# Patient Record
Sex: MALE | Race: WHITE | NOT HISPANIC OR LATINO | Employment: OTHER | URBAN - METROPOLITAN AREA
[De-identification: names, ages, dates, MRNs, and addresses within clinical notes are randomized per-mention and may not be internally consistent; named-entity substitution may affect disease eponyms.]

---

## 2023-04-03 DIAGNOSIS — E11.9 TYPE 2 DIABETES MELLITUS WITHOUT COMPLICATION, WITH LONG-TERM CURRENT USE OF INSULIN (MULTI): Primary | ICD-10-CM

## 2023-04-03 DIAGNOSIS — Z79.4 TYPE 2 DIABETES MELLITUS WITHOUT COMPLICATION, WITH LONG-TERM CURRENT USE OF INSULIN (MULTI): Primary | ICD-10-CM

## 2023-04-03 RX ORDER — PIOGLITAZONEHYDROCHLORIDE 45 MG/1
45 TABLET ORAL DAILY
Qty: 90 TABLET | Refills: 0 | Status: SHIPPED | OUTPATIENT
Start: 2023-04-03 | End: 2023-05-18

## 2023-04-03 RX ORDER — PIOGLITAZONEHYDROCHLORIDE 45 MG/1
1 TABLET ORAL DAILY
COMMUNITY
End: 2023-04-03 | Stop reason: SDUPTHER

## 2023-04-19 DIAGNOSIS — C61 PROSTATE CANCER (MULTI): ICD-10-CM

## 2023-04-19 RX ORDER — TAMSULOSIN HYDROCHLORIDE 0.4 MG/1
0.4 CAPSULE ORAL 2 TIMES DAILY
COMMUNITY
Start: 2023-01-23 | End: 2023-04-19 | Stop reason: SDUPTHER

## 2023-04-19 RX ORDER — TAMSULOSIN HYDROCHLORIDE 0.4 MG/1
0.4 CAPSULE ORAL 2 TIMES DAILY
Qty: 60 CAPSULE | Refills: 3 | Status: SHIPPED | OUTPATIENT
Start: 2023-04-19 | End: 2023-11-01

## 2023-05-15 DIAGNOSIS — Z79.4 TYPE 2 DIABETES MELLITUS WITHOUT COMPLICATION, WITH LONG-TERM CURRENT USE OF INSULIN (MULTI): ICD-10-CM

## 2023-05-15 DIAGNOSIS — E11.9 TYPE 2 DIABETES MELLITUS WITHOUT COMPLICATION, WITH LONG-TERM CURRENT USE OF INSULIN (MULTI): ICD-10-CM

## 2023-05-18 RX ORDER — PIOGLITAZONEHYDROCHLORIDE 45 MG/1
TABLET ORAL
Qty: 90 TABLET | Refills: 0 | Status: SHIPPED | OUTPATIENT
Start: 2023-05-18 | End: 2023-09-14

## 2023-06-05 DIAGNOSIS — E11.69 TYPE 2 DIABETES MELLITUS WITH OTHER SPECIFIED COMPLICATION, UNSPECIFIED WHETHER LONG TERM INSULIN USE (MULTI): ICD-10-CM

## 2023-06-05 DIAGNOSIS — K21.9 GASTROESOPHAGEAL REFLUX DISEASE, UNSPECIFIED WHETHER ESOPHAGITIS PRESENT: ICD-10-CM

## 2023-06-05 DIAGNOSIS — R53.83 OTHER FATIGUE: ICD-10-CM

## 2023-06-05 DIAGNOSIS — E78.5 HYPERLIPIDEMIA, UNSPECIFIED HYPERLIPIDEMIA TYPE: ICD-10-CM

## 2023-06-05 DIAGNOSIS — I10 HYPERTENSION, UNSPECIFIED TYPE: ICD-10-CM

## 2023-06-05 DIAGNOSIS — Z00.00 ENCOUNTER FOR GENERAL HEALTH EXAMINATION: ICD-10-CM

## 2023-06-05 RX ORDER — FLUTICASONE PROPIONATE 50 MCG
2 SPRAY, SUSPENSION (ML) NASAL DAILY
COMMUNITY
Start: 2020-01-29

## 2023-06-05 RX ORDER — FERROUS SULFATE 325(65) MG
1 TABLET ORAL DAILY
COMMUNITY
Start: 2021-08-20

## 2023-06-05 RX ORDER — METOPROLOL SUCCINATE 25 MG/1
25 TABLET, EXTENDED RELEASE ORAL DAILY
COMMUNITY
End: 2023-06-05 | Stop reason: SDUPTHER

## 2023-06-05 RX ORDER — METOPROLOL SUCCINATE 25 MG/1
25 TABLET, EXTENDED RELEASE ORAL DAILY
Qty: 90 TABLET | Refills: 0 | Status: SHIPPED | OUTPATIENT
Start: 2023-06-05 | End: 2023-09-14

## 2023-06-05 RX ORDER — FOSINOPRIL SODIUM 20 MG/1
20 TABLET ORAL DAILY
COMMUNITY
Start: 2018-06-12 | End: 2023-06-05 | Stop reason: SDUPTHER

## 2023-06-05 RX ORDER — ESOMEPRAZOLE MAGNESIUM 40 MG/1
40 CAPSULE, DELAYED RELEASE ORAL
COMMUNITY
End: 2023-06-05 | Stop reason: SDUPTHER

## 2023-06-05 RX ORDER — ESOMEPRAZOLE MAGNESIUM 40 MG/1
40 CAPSULE, DELAYED RELEASE ORAL
Qty: 90 CAPSULE | Refills: 0 | Status: SHIPPED | OUTPATIENT
Start: 2023-06-05 | End: 2023-11-01

## 2023-06-05 RX ORDER — HYDROXYZINE HYDROCHLORIDE 25 MG/1
25 TABLET, FILM COATED ORAL 3 TIMES DAILY PRN
COMMUNITY
End: 2024-01-25 | Stop reason: SDUPTHER

## 2023-06-05 RX ORDER — ATORVASTATIN CALCIUM 40 MG/1
40 TABLET, FILM COATED ORAL DAILY
Qty: 90 TABLET | Refills: 0 | Status: SHIPPED | OUTPATIENT
Start: 2023-06-05 | End: 2023-11-01

## 2023-06-05 RX ORDER — FOSINOPRIL SODIUM 20 MG/1
20 TABLET ORAL DAILY
Qty: 90 TABLET | Refills: 0 | Status: SHIPPED | OUTPATIENT
Start: 2023-06-05 | End: 2023-11-01

## 2023-06-05 RX ORDER — DULAGLUTIDE 3 MG/.5ML
INJECTION, SOLUTION SUBCUTANEOUS
COMMUNITY
Start: 2023-05-05 | End: 2023-11-08 | Stop reason: ALTCHOICE

## 2023-06-05 RX ORDER — LANOLIN ALCOHOL/MO/W.PET/CERES
1 CREAM (GRAM) TOPICAL DAILY
COMMUNITY
Start: 2021-02-11 | End: 2023-06-05 | Stop reason: ALTCHOICE

## 2023-06-05 RX ORDER — BLOOD-GLUCOSE METER
EACH MISCELLANEOUS
COMMUNITY
Start: 2020-06-11 | End: 2023-11-13 | Stop reason: HOSPADM

## 2023-06-05 RX ORDER — METFORMIN HYDROCHLORIDE 500 MG/1
2 TABLET ORAL 2 TIMES DAILY
COMMUNITY
Start: 2022-09-08 | End: 2023-06-05 | Stop reason: SINTOL

## 2023-06-05 RX ORDER — ASPIRIN 81 MG/1
1 TABLET ORAL DAILY
COMMUNITY
Start: 2021-02-11

## 2023-06-05 RX ORDER — ATORVASTATIN CALCIUM 40 MG/1
40 TABLET, FILM COATED ORAL DAILY
COMMUNITY
End: 2023-06-05 | Stop reason: SDUPTHER

## 2023-06-16 ENCOUNTER — LAB (OUTPATIENT)
Dept: LAB | Facility: LAB | Age: 78
End: 2023-06-16
Payer: MEDICARE

## 2023-06-16 DIAGNOSIS — I10 HYPERTENSION, UNSPECIFIED TYPE: ICD-10-CM

## 2023-06-16 DIAGNOSIS — E11.69 TYPE 2 DIABETES MELLITUS WITH OTHER SPECIFIED COMPLICATION, UNSPECIFIED WHETHER LONG TERM INSULIN USE (MULTI): ICD-10-CM

## 2023-06-16 DIAGNOSIS — Z00.00 ENCOUNTER FOR GENERAL HEALTH EXAMINATION: ICD-10-CM

## 2023-06-16 DIAGNOSIS — E78.5 HYPERLIPIDEMIA, UNSPECIFIED HYPERLIPIDEMIA TYPE: ICD-10-CM

## 2023-06-16 DIAGNOSIS — R53.83 OTHER FATIGUE: ICD-10-CM

## 2023-06-16 LAB
ALANINE AMINOTRANSFERASE (SGPT) (U/L) IN SER/PLAS: 20 U/L (ref 10–52)
ALBUMIN (G/DL) IN SER/PLAS: 3.9 G/DL (ref 3.4–5)
ALKALINE PHOSPHATASE (U/L) IN SER/PLAS: 62 U/L (ref 33–136)
ANION GAP IN SER/PLAS: 15 MMOL/L (ref 10–20)
ASPARTATE AMINOTRANSFERASE (SGOT) (U/L) IN SER/PLAS: 15 U/L (ref 9–39)
BILIRUBIN TOTAL (MG/DL) IN SER/PLAS: 0.8 MG/DL (ref 0–1.2)
CALCIUM (MG/DL) IN SER/PLAS: 9.2 MG/DL (ref 8.6–10.3)
CARBON DIOXIDE, TOTAL (MMOL/L) IN SER/PLAS: 24 MMOL/L (ref 21–32)
CHLORIDE (MMOL/L) IN SER/PLAS: 96 MMOL/L (ref 98–107)
CHOLESTEROL (MG/DL) IN SER/PLAS: 155 MG/DL (ref 0–199)
CHOLESTEROL IN HDL (MG/DL) IN SER/PLAS: 49.1 MG/DL
CHOLESTEROL/HDL RATIO: 3.2
CREATININE (MG/DL) IN SER/PLAS: 1.21 MG/DL (ref 0.5–1.3)
ERYTHROCYTE DISTRIBUTION WIDTH (RATIO) BY AUTOMATED COUNT: 13.5 % (ref 11.5–14.5)
ERYTHROCYTE MEAN CORPUSCULAR HEMOGLOBIN CONCENTRATION (G/DL) BY AUTOMATED: 32.1 G/DL (ref 32–36)
ERYTHROCYTE MEAN CORPUSCULAR VOLUME (FL) BY AUTOMATED COUNT: 91 FL (ref 80–100)
ERYTHROCYTES (10*6/UL) IN BLOOD BY AUTOMATED COUNT: 5.06 X10E12/L (ref 4.5–5.9)
GFR MALE: 62 ML/MIN/1.73M2
GLUCOSE (MG/DL) IN SER/PLAS: 489 MG/DL (ref 74–99)
HEMATOCRIT (%) IN BLOOD BY AUTOMATED COUNT: 45.8 % (ref 41–52)
HEMOGLOBIN (G/DL) IN BLOOD: 14.7 G/DL (ref 13.5–17.5)
LDL: 62 MG/DL (ref 0–99)
LEUKOCYTES (10*3/UL) IN BLOOD BY AUTOMATED COUNT: 4.4 X10E9/L (ref 4.4–11.3)
NON HDL CHOLESTEROL: 106 MG/DL
PLATELETS (10*3/UL) IN BLOOD AUTOMATED COUNT: 258 X10E9/L (ref 150–450)
POTASSIUM (MMOL/L) IN SER/PLAS: 3.8 MMOL/L (ref 3.5–5.3)
PROTEIN TOTAL: 6.3 G/DL (ref 6.4–8.2)
SODIUM (MMOL/L) IN SER/PLAS: 131 MMOL/L (ref 136–145)
THYROTROPIN (MIU/L) IN SER/PLAS BY DETECTION LIMIT <= 0.05 MIU/L: 1.73 MIU/L (ref 0.44–3.98)
TRIGLYCERIDE (MG/DL) IN SER/PLAS: 221 MG/DL (ref 0–149)
UREA NITROGEN (MG/DL) IN SER/PLAS: 14 MG/DL (ref 6–23)
VLDL: 44 MG/DL (ref 0–40)

## 2023-06-16 PROCEDURE — 84443 ASSAY THYROID STIM HORMONE: CPT

## 2023-06-16 PROCEDURE — 80053 COMPREHEN METABOLIC PANEL: CPT

## 2023-06-16 PROCEDURE — 82306 VITAMIN D 25 HYDROXY: CPT

## 2023-06-16 PROCEDURE — 83036 HEMOGLOBIN GLYCOSYLATED A1C: CPT

## 2023-06-16 PROCEDURE — 80061 LIPID PANEL: CPT

## 2023-06-16 PROCEDURE — 85027 COMPLETE CBC AUTOMATED: CPT

## 2023-06-16 PROCEDURE — 36415 COLL VENOUS BLD VENIPUNCTURE: CPT

## 2023-06-16 PROCEDURE — 84153 ASSAY OF PSA TOTAL: CPT

## 2023-06-17 LAB
CALCIDIOL (25 OH VITAMIN D3) (NG/ML) IN SER/PLAS: 41 NG/ML
PROSTATE SPECIFIC ANTIGEN,SCREEN: <0.1 NG/ML (ref 0–4)

## 2023-06-19 ENCOUNTER — TELEPHONE (OUTPATIENT)
Dept: PRIMARY CARE | Facility: CLINIC | Age: 78
End: 2023-06-19
Payer: MEDICARE

## 2023-06-19 LAB
ESTIMATED AVERAGE GLUCOSE FOR HBA1C: 427 MG/DL
HEMOGLOBIN A1C/HEMOGLOBIN TOTAL IN BLOOD: 16.5 %

## 2023-06-20 LAB
PROSTATE SPECIFIC AG (NG/ML) IN SER/PLAS: <0.1 NG/ML (ref 0–4)
PROSTATE SPECIFIC AG FREE (NG/ML) IN SER/PLAS: <0.1 NG/ML
PROSTATE SPECIFIC AG FREE/PROSTATE SPECIFIC AG TOTAL IN SER/PLAS: NORMAL %

## 2023-06-28 ENCOUNTER — APPOINTMENT (OUTPATIENT)
Dept: PRIMARY CARE | Facility: CLINIC | Age: 78
End: 2023-06-28
Payer: MEDICARE

## 2023-07-05 ENCOUNTER — APPOINTMENT (OUTPATIENT)
Dept: PRIMARY CARE | Facility: CLINIC | Age: 78
End: 2023-07-05
Payer: MEDICARE

## 2023-09-12 DIAGNOSIS — E11.9 TYPE 2 DIABETES MELLITUS WITHOUT COMPLICATION, WITH LONG-TERM CURRENT USE OF INSULIN (MULTI): ICD-10-CM

## 2023-09-12 DIAGNOSIS — I10 HYPERTENSION, UNSPECIFIED TYPE: ICD-10-CM

## 2023-09-12 DIAGNOSIS — Z79.4 TYPE 2 DIABETES MELLITUS WITHOUT COMPLICATION, WITH LONG-TERM CURRENT USE OF INSULIN (MULTI): ICD-10-CM

## 2023-09-14 RX ORDER — METOPROLOL SUCCINATE 25 MG/1
25 TABLET, EXTENDED RELEASE ORAL DAILY
Qty: 30 TABLET | Refills: 0 | Status: SHIPPED | OUTPATIENT
Start: 2023-09-14 | End: 2024-04-09 | Stop reason: SDUPTHER

## 2023-09-14 RX ORDER — PIOGLITAZONEHYDROCHLORIDE 45 MG/1
TABLET ORAL
Qty: 30 TABLET | Refills: 0 | Status: SHIPPED | OUTPATIENT
Start: 2023-09-14 | End: 2023-11-01

## 2023-10-31 DIAGNOSIS — Z79.4 TYPE 2 DIABETES MELLITUS WITHOUT COMPLICATION, WITH LONG-TERM CURRENT USE OF INSULIN (MULTI): ICD-10-CM

## 2023-10-31 DIAGNOSIS — I10 HYPERTENSION, UNSPECIFIED TYPE: ICD-10-CM

## 2023-10-31 DIAGNOSIS — C61 PROSTATE CANCER (MULTI): ICD-10-CM

## 2023-10-31 DIAGNOSIS — E78.5 HYPERLIPIDEMIA, UNSPECIFIED HYPERLIPIDEMIA TYPE: ICD-10-CM

## 2023-10-31 DIAGNOSIS — K21.9 GASTROESOPHAGEAL REFLUX DISEASE, UNSPECIFIED WHETHER ESOPHAGITIS PRESENT: ICD-10-CM

## 2023-10-31 DIAGNOSIS — E11.9 TYPE 2 DIABETES MELLITUS WITHOUT COMPLICATION, WITH LONG-TERM CURRENT USE OF INSULIN (MULTI): ICD-10-CM

## 2023-11-01 RX ORDER — ATORVASTATIN CALCIUM 40 MG/1
40 TABLET, FILM COATED ORAL DAILY
Qty: 90 TABLET | Refills: 1 | Status: SHIPPED | OUTPATIENT
Start: 2023-11-01 | End: 2024-03-12

## 2023-11-01 RX ORDER — FOSINOPRIL SODIUM 20 MG/1
20 TABLET ORAL DAILY
Qty: 90 TABLET | Refills: 1 | Status: SHIPPED | OUTPATIENT
Start: 2023-11-01 | End: 2024-05-30 | Stop reason: SDUPTHER

## 2023-11-01 RX ORDER — ESOMEPRAZOLE MAGNESIUM 40 MG/1
40 CAPSULE, DELAYED RELEASE ORAL
Qty: 90 CAPSULE | Refills: 1 | Status: SHIPPED | OUTPATIENT
Start: 2023-11-01 | End: 2023-12-11

## 2023-11-01 RX ORDER — TAMSULOSIN HYDROCHLORIDE 0.4 MG/1
CAPSULE ORAL
Qty: 60 CAPSULE | Refills: 3 | Status: SHIPPED | OUTPATIENT
Start: 2023-11-01 | End: 2024-04-29 | Stop reason: SDUPTHER

## 2023-11-01 RX ORDER — PIOGLITAZONEHYDROCHLORIDE 45 MG/1
TABLET ORAL
Qty: 30 TABLET | Refills: 3 | Status: SHIPPED | OUTPATIENT
Start: 2023-11-01 | End: 2023-11-13 | Stop reason: HOSPADM

## 2023-11-06 RX ORDER — MIRABEGRON 50 MG/1
50 TABLET, FILM COATED, EXTENDED RELEASE ORAL DAILY
COMMUNITY
Start: 2023-10-27

## 2023-11-06 ASSESSMENT — ENCOUNTER SYMPTOMS
BLACKOUTS: 0
CONFUSION: 0
TREMORS: 1
SPEECH DIFFICULTY: 0
NERVOUS/ANXIOUS: 1
HUNGER: 0
HEADACHES: 1
WEAKNESS: 1
POLYPHAGIA: 0
POLYDIPSIA: 0
FATIGUE: 0
VISUAL CHANGE: 0
SWEATS: 0
DIZZINESS: 0
WEIGHT LOSS: 1
BLURRED VISION: 0
SEIZURES: 0

## 2023-11-08 ENCOUNTER — OFFICE VISIT (OUTPATIENT)
Dept: PRIMARY CARE | Facility: CLINIC | Age: 78
End: 2023-11-08
Payer: MEDICARE

## 2023-11-08 VITALS
OXYGEN SATURATION: 95 % | WEIGHT: 156 LBS | SYSTOLIC BLOOD PRESSURE: 155 MMHG | HEIGHT: 68 IN | HEART RATE: 97 BPM | DIASTOLIC BLOOD PRESSURE: 76 MMHG | BODY MASS INDEX: 23.64 KG/M2

## 2023-11-08 DIAGNOSIS — K22.70 BARRETT'S ESOPHAGUS WITHOUT DYSPLASIA: ICD-10-CM

## 2023-11-08 DIAGNOSIS — C61 MALIGNANT NEOPLASM OF PROSTATE (MULTI): ICD-10-CM

## 2023-11-08 DIAGNOSIS — E11.69 TYPE 2 DIABETES MELLITUS WITH OTHER SPECIFIED COMPLICATION, WITHOUT LONG-TERM CURRENT USE OF INSULIN (MULTI): ICD-10-CM

## 2023-11-08 DIAGNOSIS — F32.A DEPRESSIVE DISORDER: ICD-10-CM

## 2023-11-08 DIAGNOSIS — Z00.00 MEDICARE ANNUAL WELLNESS VISIT, SUBSEQUENT: Primary | ICD-10-CM

## 2023-11-08 DIAGNOSIS — G95.20 CERVICAL SPINAL CORD COMPRESSION (MULTI): ICD-10-CM

## 2023-11-08 DIAGNOSIS — I10 BENIGN ESSENTIAL HYPERTENSION: ICD-10-CM

## 2023-11-08 DIAGNOSIS — R21 RASH AND NONSPECIFIC SKIN ERUPTION: ICD-10-CM

## 2023-11-08 DIAGNOSIS — E11.9 CONTROLLED TYPE 2 DIABETES MELLITUS WITHOUT COMPLICATION, UNSPECIFIED WHETHER LONG TERM INSULIN USE (MULTI): ICD-10-CM

## 2023-11-08 DIAGNOSIS — E78.5 HYPERLIPIDEMIA, UNSPECIFIED HYPERLIPIDEMIA TYPE: ICD-10-CM

## 2023-11-08 DIAGNOSIS — Z00.00 ROUTINE GENERAL MEDICAL EXAMINATION AT HEALTH CARE FACILITY: ICD-10-CM

## 2023-11-08 DIAGNOSIS — M48.02 DEGENERATIVE CERVICAL SPINAL STENOSIS: ICD-10-CM

## 2023-11-08 PROCEDURE — G0439 PPPS, SUBSEQ VISIT: HCPCS | Performed by: NURSE PRACTITIONER

## 2023-11-08 PROCEDURE — 1036F TOBACCO NON-USER: CPT | Performed by: NURSE PRACTITIONER

## 2023-11-08 PROCEDURE — 90662 IIV NO PRSV INCREASED AG IM: CPT | Performed by: NURSE PRACTITIONER

## 2023-11-08 PROCEDURE — 3078F DIAST BP <80 MM HG: CPT | Performed by: NURSE PRACTITIONER

## 2023-11-08 PROCEDURE — 3077F SYST BP >= 140 MM HG: CPT | Performed by: NURSE PRACTITIONER

## 2023-11-08 PROCEDURE — 99214 OFFICE O/P EST MOD 30 MIN: CPT | Performed by: NURSE PRACTITIONER

## 2023-11-08 PROCEDURE — 1159F MED LIST DOCD IN RCRD: CPT | Performed by: NURSE PRACTITIONER

## 2023-11-08 PROCEDURE — 1160F RVW MEDS BY RX/DR IN RCRD: CPT | Performed by: NURSE PRACTITIONER

## 2023-11-08 PROCEDURE — G0008 ADMIN INFLUENZA VIRUS VAC: HCPCS | Performed by: NURSE PRACTITIONER

## 2023-11-08 PROCEDURE — 1170F FXNL STATUS ASSESSED: CPT | Performed by: NURSE PRACTITIONER

## 2023-11-08 RX ORDER — ORAL SEMAGLUTIDE 7 MG/1
7 TABLET ORAL DAILY
Qty: 30 TABLET | Refills: 0 | Status: SHIPPED | OUTPATIENT
Start: 2023-11-08 | End: 2023-12-12 | Stop reason: ALTCHOICE

## 2023-11-08 ASSESSMENT — PATIENT HEALTH QUESTIONNAIRE - PHQ9
2. FEELING DOWN, DEPRESSED OR HOPELESS: NOT AT ALL
SUM OF ALL RESPONSES TO PHQ9 QUESTIONS 1 AND 2: 0
1. LITTLE INTEREST OR PLEASURE IN DOING THINGS: NOT AT ALL

## 2023-11-08 ASSESSMENT — ENCOUNTER SYMPTOMS
VISUAL CHANGE: 0
MUSCULOSKELETAL NEGATIVE: 1
SORE THROAT: 0
WEIGHT LOSS: 1
POLYDIPSIA: 0
FATIGUE: 0
COUGH: 0
POLYPHAGIA: 0
BLACKOUTS: 0
DIARRHEA: 0
SHORTNESS OF BREATH: 0
CONSTIPATION: 0
FEVER: 0
NERVOUS/ANXIOUS: 1
SWEATS: 0
DIZZINESS: 0
SEIZURES: 0
CHILLS: 0
SPEECH DIFFICULTY: 0
NUMBNESS: 0
HEADACHES: 1
VOMITING: 0
HUNGER: 0
BLURRED VISION: 0
NAUSEA: 0
CONFUSION: 0
ABDOMINAL PAIN: 0
PALPITATIONS: 0

## 2023-11-08 ASSESSMENT — ACTIVITIES OF DAILY LIVING (ADL)
MANAGING_FINANCES: INDEPENDENT
BATHING: INDEPENDENT
GROCERY_SHOPPING: INDEPENDENT
DOING_HOUSEWORK: TOTAL CARE
TAKING_MEDICATION: INDEPENDENT
DRESSING: INDEPENDENT

## 2023-11-08 NOTE — PATIENT INSTRUCTIONS
Have A1c drawn tomorrow  For diaper rash cream to irritated area  Follow-up in 1 month  Start Rybelsus 3 mg for 1 week.  Increase to 2 tablets of 6 mg until next visit

## 2023-11-08 NOTE — PROGRESS NOTES
Subjective   Patient ID: Eleno Glaser is a 78 y.o. male who presents for Medicare Annual Wellness Visit Subsequent.    HPI:  Having tooth pain.  Seeing dentist tomorrow.  Concerned about diabetes medication and not being able to get them.  He is  aware of how uncontrolled his numbers are.  Having sore area at the top of his buttocks.  Denies chest pain, SOB, palpitations, dizziness,  or GI issues.        Diabetes  He has type 2 diabetes mellitus. No MedicAlert identification noted. The initial diagnosis of diabetes was made 35 years ago. Hypoglycemia symptoms include headaches, mood changes and nervousness/anxiousness. Pertinent negatives for hypoglycemia include no confusion, dizziness, hunger, pallor, seizures, sleepiness, speech difficulty or sweats. Associated symptoms include foot paresthesias and weight loss. Pertinent negatives for diabetes include no blurred vision, no chest pain, no fatigue, no foot ulcerations, no polydipsia, no polyphagia and no visual change. Pertinent negatives for hypoglycemia complications include no blackouts, no hospitalization, no nocturnal hypoglycemia, no required assistance and no required glucagon injection. Symptoms are stable. Diabetic complications include peripheral neuropathy and retinopathy. Pertinent negatives for diabetic complications include no CVA, heart disease, impotence, nephropathy or PVD. Risk factors for coronary artery disease include dyslipidemia, family history, hypertension, sedentary lifestyle and stress. Current diabetic treatment includes oral agent (monotherapy). He is compliant with treatment none of the time. His weight is stable. He is following a generally healthy diet. When asked about meal planning, he reported none. He has not had a previous visit with a dietitian. He participates in exercise intermittently. He monitors blood glucose at home 1-2 x per week. He monitors urine at home <1 x per month. Blood glucose monitoring compliance is poor.  "His home blood glucose trend is fluctuating minimally. His breakfast blood glucose is taken after 10 am. His breakfast blood glucose range is generally >200 mg/dl. His lunch blood glucose is taken after 3 pm. His lunch blood glucose range is generally >200 mg/dl. His dinner blood glucose is taken between 6-7 pm. His dinner blood glucose range is generally >200 mg/dl. His overall blood glucose range is >200 mg/dl. He does not see a podiatrist.Eye exam is not current.        Review of Systems   Constitutional:  Positive for weight loss. Negative for chills, fatigue and fever.   HENT:  Negative for congestion, ear pain and sore throat.    Eyes:  Negative for blurred vision and visual disturbance.   Respiratory:  Negative for cough and shortness of breath.    Cardiovascular:  Negative for chest pain, palpitations and leg swelling.   Gastrointestinal:  Negative for abdominal pain, constipation, diarrhea, nausea and vomiting.   Endocrine: Negative for polydipsia and polyphagia.   Genitourinary: Negative.  Negative for impotence.   Musculoskeletal: Negative.    Skin:  Negative for pallor and rash.   Neurological:  Positive for headaches. Negative for dizziness, seizures, speech difficulty and numbness.   Psychiatric/Behavioral:  Negative for confusion. The patient is nervous/anxious.        Objective   /76   Pulse 97   Ht 1.727 m (5' 8\")   Wt 70.8 kg (156 lb)   SpO2 95%   BMI 23.72 kg/m²     Physical Exam  Constitutional:       General: He is not in acute distress.     Appearance: Normal appearance.   HENT:      Head: Normocephalic and atraumatic.      Right Ear: Tympanic membrane, ear canal and external ear normal.      Left Ear: Tympanic membrane, ear canal and external ear normal.      Nose: Nose normal.      Mouth/Throat:      Mouth: Mucous membranes are moist.      Pharynx: Oropharynx is clear.   Eyes:      Extraocular Movements: Extraocular movements intact.      Conjunctiva/sclera: Conjunctivae normal.    "   Pupils: Pupils are equal, round, and reactive to light.   Cardiovascular:      Rate and Rhythm: Normal rate and regular rhythm.      Pulses: Normal pulses.      Heart sounds: Normal heart sounds. No murmur heard.  Pulmonary:      Effort: Pulmonary effort is normal.      Breath sounds: Normal breath sounds. No wheezing, rhonchi or rales.   Abdominal:      General: Bowel sounds are normal.      Palpations: Abdomen is soft.      Tenderness: There is no abdominal tenderness.   Musculoskeletal:         General: Normal range of motion.      Cervical back: Normal range of motion and neck supple.   Lymphadenopathy:      Comments: No lymphadenopathy noted   Skin:     General: Skin is warm and dry.      Findings: Rash present.             Comments: Small excoriation noted to crevice at top of buttocks.  Small area of open skin and redness.  No signs or symptoms of infection   Neurological:      General: No focal deficit present.      Mental Status: He is alert and oriented to person, place, and time.      Cranial Nerves: No cranial nerve deficit.      Coordination: Coordination normal.      Gait: Gait normal.   Psychiatric:         Mood and Affect: Mood normal.         Behavior: Behavior normal.         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  Patient follows with urology  Takes tamsulosin as prescribed         Rash and nonspecific skin eruption R21     Use Desitin cream or other form of skin barrier  to open area at top of buttocks.  Keep area clean and dry  We will reevaluate at next in 1 month          Other Visit Diagnoses         Codes    Type 2 diabetes mellitus with other specified complication, without long-term current use of insulin (CMS/[facility])     E11.69    Relevant Medications    semaglutide (Rybelsus) 7 mg tablet          #1. T2DM  Has stopped Trulicity  Has stopped metformin for ongoing nausea  Continue Actos 45 mg daily  Start Rybelsus 3 mg for 2 weeks then increase to 6 mg, taking 2 tablets until new insurance kicks in and we can establish continuous medications for A1c control  Check A1C today.     #2. HTN  Continue metoprolol 25 mg daily  Continue Fosinopril 20 mg daily  Restrict dietary sodium to less than 2000 mg daily  Monitor BP at home and record     #3. HLD   Continue atorvastatin 40 mg daily     #4. Prostate Cancer Hx / BPH  Continue Tamsulosin 0.4 mg daily     Follow up in 1 months or sooner if needed  Check A1C today  Recheck in 3 months     CT cardiac calcium score: 2/2020 = 346     PSA: normal 8/2022  Colonoscopy: Due 3/2029 EGD: Due 2/2023  Pneumonia: --  Shingles: --  Flu vaccine: UTD 2023  Continue all chronic illness medications as prescribed  Stopped Trulicity   Continue pioglitazone  Continue fosinopril and metoprolol  Follow up in 3 months or sooner if needed, will recheck A1C at that time.

## 2023-11-08 NOTE — ASSESSMENT & PLAN NOTE
Use Desitin cream or other form of skin barrier to open area at top of buttocks.  Keep area clean and dry  We will reevaluate at next in 1 month

## 2023-11-09 DIAGNOSIS — E11.9 CONTROLLED TYPE 2 DIABETES MELLITUS WITHOUT COMPLICATION, UNSPECIFIED WHETHER LONG TERM INSULIN USE (MULTI): ICD-10-CM

## 2023-11-09 DIAGNOSIS — E78.5 HYPERLIPIDEMIA, UNSPECIFIED HYPERLIPIDEMIA TYPE: ICD-10-CM

## 2023-11-09 DIAGNOSIS — I10 BENIGN ESSENTIAL HYPERTENSION: ICD-10-CM

## 2023-11-10 ENCOUNTER — LAB (OUTPATIENT)
Dept: LAB | Facility: LAB | Age: 78
End: 2023-11-10
Payer: MEDICARE

## 2023-11-10 ENCOUNTER — HOSPITAL ENCOUNTER (INPATIENT)
Facility: HOSPITAL | Age: 78
LOS: 1 days | Discharge: HOME | DRG: 638 | End: 2023-11-13
Attending: STUDENT IN AN ORGANIZED HEALTH CARE EDUCATION/TRAINING PROGRAM | Admitting: INTERNAL MEDICINE
Payer: MEDICARE

## 2023-11-10 DIAGNOSIS — E11.9 CONTROLLED TYPE 2 DIABETES MELLITUS WITHOUT COMPLICATION, UNSPECIFIED WHETHER LONG TERM INSULIN USE (MULTI): ICD-10-CM

## 2023-11-10 DIAGNOSIS — R73.9 HYPERGLYCEMIA: ICD-10-CM

## 2023-11-10 DIAGNOSIS — E78.5 HYPERLIPIDEMIA, UNSPECIFIED HYPERLIPIDEMIA TYPE: ICD-10-CM

## 2023-11-10 DIAGNOSIS — E11.65 TYPE 2 DIABETES MELLITUS WITH HYPERGLYCEMIA, WITHOUT LONG-TERM CURRENT USE OF INSULIN (MULTI): Primary | ICD-10-CM

## 2023-11-10 DIAGNOSIS — I10 BENIGN ESSENTIAL HYPERTENSION: ICD-10-CM

## 2023-11-10 LAB
ALBUMIN SERPL BCP-MCNC: 4.1 G/DL (ref 3.4–5)
ALP SERPL-CCNC: 68 U/L (ref 33–136)
ALT SERPL W P-5'-P-CCNC: 18 U/L (ref 10–52)
ANION GAP BLDV CALCULATED.4IONS-SCNC: 11 MMOL/L (ref 10–25)
ANION GAP SERPL CALC-SCNC: 20 MMOL/L (ref 10–20)
AST SERPL W P-5'-P-CCNC: 15 U/L (ref 9–39)
BASE EXCESS BLDV CALC-SCNC: 3.2 MMOL/L (ref -2–3)
BASOPHILS # BLD AUTO: 0.05 X10*3/UL (ref 0–0.1)
BASOPHILS NFR BLD AUTO: 1.1 %
BILIRUB SERPL-MCNC: 0.6 MG/DL (ref 0–1.2)
BODY TEMPERATURE: ABNORMAL
BUN SERPL-MCNC: 17 MG/DL (ref 6–23)
CA-I BLDV-SCNC: 1.25 MMOL/L (ref 1.1–1.33)
CALCIUM SERPL-MCNC: 9.4 MG/DL (ref 8.6–10.3)
CHLORIDE BLDV-SCNC: 92 MMOL/L (ref 98–107)
CHLORIDE SERPL-SCNC: 90 MMOL/L (ref 98–107)
CHOLEST SERPL-MCNC: 175 MG/DL (ref 0–199)
CHOLESTEROL/HDL RATIO: 3.2
CO2 SERPL-SCNC: 23 MMOL/L (ref 21–32)
CREAT SERPL-MCNC: 1.23 MG/DL (ref 0.5–1.3)
EOSINOPHIL # BLD AUTO: 0.22 X10*3/UL (ref 0–0.4)
EOSINOPHIL NFR BLD AUTO: 4.8 %
ERYTHROCYTE [DISTWIDTH] IN BLOOD BY AUTOMATED COUNT: 13.6 % (ref 11.5–14.5)
ERYTHROCYTE [DISTWIDTH] IN BLOOD BY AUTOMATED COUNT: 13.8 % (ref 11.5–14.5)
GFR SERPL CREATININE-BSD FRML MDRD: 60 ML/MIN/1.73M*2
GLUCOSE BLD MANUAL STRIP-MCNC: >600 MG/DL (ref 74–99)
GLUCOSE BLDV-MCNC: >685 MG/DL (ref 74–99)
GLUCOSE SERPL-MCNC: 690 MG/DL (ref 74–99)
HCO3 BLDV-SCNC: 26.9 MMOL/L (ref 22–26)
HCT VFR BLD AUTO: 40.6 % (ref 41–52)
HCT VFR BLD AUTO: 43.6 % (ref 41–52)
HCT VFR BLD EST: 39 % (ref 41–52)
HDLC SERPL-MCNC: 54.8 MG/DL
HGB BLD-MCNC: 13.5 G/DL (ref 13.5–17.5)
HGB BLD-MCNC: 14.1 G/DL (ref 13.5–17.5)
HGB BLDV-MCNC: 13 G/DL (ref 13.5–17.5)
IMM GRANULOCYTES # BLD AUTO: 0.07 X10*3/UL (ref 0–0.5)
IMM GRANULOCYTES NFR BLD AUTO: 1.5 % (ref 0–0.9)
INHALED O2 CONCENTRATION: 0 %
LACTATE BLDV-SCNC: 1.3 MMOL/L (ref 0.4–2)
LDLC SERPL CALC-MCNC: 82 MG/DL
LYMPHOCYTES # BLD AUTO: 0.69 X10*3/UL (ref 0.8–3)
LYMPHOCYTES NFR BLD AUTO: 15.2 %
MCH RBC QN AUTO: 29.3 PG (ref 26–34)
MCH RBC QN AUTO: 29.7 PG (ref 26–34)
MCHC RBC AUTO-ENTMCNC: 32.3 G/DL (ref 32–36)
MCHC RBC AUTO-ENTMCNC: 33.3 G/DL (ref 32–36)
MCV RBC AUTO: 88 FL (ref 80–100)
MCV RBC AUTO: 92 FL (ref 80–100)
MONOCYTES # BLD AUTO: 0.47 X10*3/UL (ref 0.05–0.8)
MONOCYTES NFR BLD AUTO: 10.3 %
NEUTROPHILS # BLD AUTO: 3.05 X10*3/UL (ref 1.6–5.5)
NEUTROPHILS NFR BLD AUTO: 67.1 %
NON HDL CHOLESTEROL: 120 MG/DL (ref 0–149)
NRBC BLD-RTO: 0 /100 WBCS (ref 0–0)
NRBC BLD-RTO: 0 /100 WBCS (ref 0–0)
OXYHGB MFR BLDV: 86 % (ref 45–75)
PCO2 BLDV: 37 MM HG (ref 41–51)
PH BLDV: 7.47 PH (ref 7.33–7.43)
PLATELET # BLD AUTO: 266 X10*3/UL (ref 150–450)
PLATELET # BLD AUTO: 295 X10*3/UL (ref 150–450)
PO2 BLDV: 54 MM HG (ref 35–45)
POTASSIUM BLDV-SCNC: 4.5 MMOL/L (ref 3.5–5.3)
POTASSIUM SERPL-SCNC: 4.5 MMOL/L (ref 3.5–5.3)
PROT SERPL-MCNC: 6.2 G/DL (ref 6.4–8.2)
RBC # BLD AUTO: 4.6 X10*6/UL (ref 4.5–5.9)
RBC # BLD AUTO: 4.75 X10*6/UL (ref 4.5–5.9)
SAO2 % BLDV: 89 % (ref 45–75)
SODIUM BLDV-SCNC: 125 MMOL/L (ref 136–145)
SODIUM SERPL-SCNC: 128 MMOL/L (ref 136–145)
TRIGL SERPL-MCNC: 192 MG/DL (ref 0–149)
VLDL: 38 MG/DL (ref 0–40)
WBC # BLD AUTO: 4.6 X10*3/UL (ref 4.4–11.3)
WBC # BLD AUTO: 5.2 X10*3/UL (ref 4.4–11.3)

## 2023-11-10 PROCEDURE — 83036 HEMOGLOBIN GLYCOSYLATED A1C: CPT

## 2023-11-10 PROCEDURE — 99285 EMERGENCY DEPT VISIT HI MDM: CPT | Mod: 25 | Performed by: STUDENT IN AN ORGANIZED HEALTH CARE EDUCATION/TRAINING PROGRAM

## 2023-11-10 PROCEDURE — 82947 ASSAY GLUCOSE BLOOD QUANT: CPT

## 2023-11-10 PROCEDURE — 82947 ASSAY GLUCOSE BLOOD QUANT: CPT | Performed by: PHYSICIAN ASSISTANT

## 2023-11-10 PROCEDURE — 84132 ASSAY OF SERUM POTASSIUM: CPT | Performed by: PHYSICIAN ASSISTANT

## 2023-11-10 PROCEDURE — 80061 LIPID PANEL: CPT

## 2023-11-10 PROCEDURE — 96360 HYDRATION IV INFUSION INIT: CPT

## 2023-11-10 PROCEDURE — 83735 ASSAY OF MAGNESIUM: CPT | Performed by: PHYSICIAN ASSISTANT

## 2023-11-10 PROCEDURE — 85025 COMPLETE CBC W/AUTO DIFF WBC: CPT | Performed by: PHYSICIAN ASSISTANT

## 2023-11-10 PROCEDURE — 2500000004 HC RX 250 GENERAL PHARMACY W/ HCPCS (ALT 636 FOR OP/ED): Performed by: PHYSICIAN ASSISTANT

## 2023-11-10 PROCEDURE — 94760 N-INVAS EAR/PLS OXIMETRY 1: CPT

## 2023-11-10 PROCEDURE — 80053 COMPREHEN METABOLIC PANEL: CPT

## 2023-11-10 PROCEDURE — 82330 ASSAY OF CALCIUM: CPT | Performed by: PHYSICIAN ASSISTANT

## 2023-11-10 PROCEDURE — 36415 COLL VENOUS BLD VENIPUNCTURE: CPT | Performed by: PHYSICIAN ASSISTANT

## 2023-11-10 PROCEDURE — 85027 COMPLETE CBC AUTOMATED: CPT

## 2023-11-10 PROCEDURE — 82435 ASSAY OF BLOOD CHLORIDE: CPT | Performed by: PHYSICIAN ASSISTANT

## 2023-11-10 PROCEDURE — 84295 ASSAY OF SERUM SODIUM: CPT | Performed by: PHYSICIAN ASSISTANT

## 2023-11-10 RX ORDER — INSULIN LISPRO 100 [IU]/ML
12 INJECTION, SOLUTION INTRAVENOUS; SUBCUTANEOUS ONCE
Status: COMPLETED | OUTPATIENT
Start: 2023-11-10 | End: 2023-11-11

## 2023-11-10 RX ADMIN — SODIUM CHLORIDE 1000 ML: 9 INJECTION, SOLUTION INTRAVENOUS at 23:20

## 2023-11-10 ASSESSMENT — COLUMBIA-SUICIDE SEVERITY RATING SCALE - C-SSRS
2. HAVE YOU ACTUALLY HAD ANY THOUGHTS OF KILLING YOURSELF?: NO
6. HAVE YOU EVER DONE ANYTHING, STARTED TO DO ANYTHING, OR PREPARED TO DO ANYTHING TO END YOUR LIFE?: NO
1. IN THE PAST MONTH, HAVE YOU WISHED YOU WERE DEAD OR WISHED YOU COULD GO TO SLEEP AND NOT WAKE UP?: NO

## 2023-11-10 ASSESSMENT — PAIN SCALES - GENERAL: PAINLEVEL_OUTOF10: 0 - NO PAIN

## 2023-11-10 ASSESSMENT — PAIN - FUNCTIONAL ASSESSMENT: PAIN_FUNCTIONAL_ASSESSMENT: 0-10

## 2023-11-11 PROBLEM — E11.65 HYPERGLYCEMIA DUE TO DIABETES MELLITUS (MULTI): Status: ACTIVE | Noted: 2023-11-11

## 2023-11-11 LAB
ALBUMIN SERPL BCP-MCNC: 4 G/DL (ref 3.4–5)
ALP SERPL-CCNC: 64 U/L (ref 33–136)
ALT SERPL W P-5'-P-CCNC: 17 U/L (ref 10–52)
ANION GAP SERPL CALC-SCNC: 12 MMOL/L (ref 10–20)
ANION GAP SERPL CALC-SCNC: 17 MMOL/L
APPEARANCE UR: CLEAR
AST SERPL W P-5'-P-CCNC: 13 U/L (ref 9–39)
BILIRUB SERPL-MCNC: 0.5 MG/DL (ref 0–1.2)
BILIRUB UR STRIP.AUTO-MCNC: NEGATIVE MG/DL
BUN SERPL-MCNC: 13 MG/DL (ref 6–23)
BUN SERPL-MCNC: 16 MG/DL (ref 6–23)
CALCIUM SERPL-MCNC: 9.1 MG/DL (ref 8.6–10.3)
CALCIUM SERPL-MCNC: 9.5 MG/DL (ref 8.6–10.3)
CHLORIDE SERPL-SCNC: 102 MMOL/L (ref 98–107)
CHLORIDE SERPL-SCNC: 90 MMOL/L (ref 98–107)
CO2 SERPL-SCNC: 22 MMOL/L (ref 21–32)
CO2 SERPL-SCNC: 26 MMOL/L (ref 21–32)
COLOR UR: YELLOW
CREAT SERPL-MCNC: 0.99 MG/DL (ref 0.5–1.3)
CREAT SERPL-MCNC: 1.27 MG/DL (ref 0.5–1.3)
EST. AVERAGE GLUCOSE BLD GHB EST-MCNC: 458 MG/DL
GFR SERPL CREATININE-BSD FRML MDRD: 58 ML/MIN/1.73M*2
GFR SERPL CREATININE-BSD FRML MDRD: 78 ML/MIN/1.73M*2
GLUCOSE BLD MANUAL STRIP-MCNC: 254 MG/DL (ref 74–99)
GLUCOSE BLD MANUAL STRIP-MCNC: 258 MG/DL (ref 74–99)
GLUCOSE BLD MANUAL STRIP-MCNC: 291 MG/DL (ref 74–99)
GLUCOSE BLD MANUAL STRIP-MCNC: 344 MG/DL (ref 74–99)
GLUCOSE BLD MANUAL STRIP-MCNC: 474 MG/DL (ref 74–99)
GLUCOSE SERPL-MCNC: 130 MG/DL (ref 74–99)
GLUCOSE SERPL-MCNC: 700 MG/DL (ref 74–99)
GLUCOSE UR STRIP.AUTO-MCNC: ABNORMAL MG/DL
HBA1C MFR BLD: 17.6 %
KETONES UR STRIP.AUTO-MCNC: ABNORMAL MG/DL
LEUKOCYTE ESTERASE UR QL STRIP.AUTO: NEGATIVE
MAGNESIUM SERPL-MCNC: 1.64 MG/DL (ref 1.6–2.4)
NITRITE UR QL STRIP.AUTO: NEGATIVE
PH UR STRIP.AUTO: 5 [PH]
POTASSIUM SERPL-SCNC: 3.4 MMOL/L (ref 3.5–5.3)
POTASSIUM SERPL-SCNC: 4.4 MMOL/L (ref 3.5–5.3)
PROT SERPL-MCNC: 6.6 G/DL (ref 6.4–8.2)
PROT UR STRIP.AUTO-MCNC: NEGATIVE MG/DL
RBC # UR STRIP.AUTO: NEGATIVE /UL
SODIUM SERPL-SCNC: 125 MMOL/L (ref 136–145)
SODIUM SERPL-SCNC: 137 MMOL/L (ref 136–145)
SP GR UR STRIP.AUTO: 1.02
UROBILINOGEN UR STRIP.AUTO-MCNC: <2 MG/DL

## 2023-11-11 PROCEDURE — G0378 HOSPITAL OBSERVATION PER HR: HCPCS

## 2023-11-11 PROCEDURE — 2500000002 HC RX 250 W HCPCS SELF ADMINISTERED DRUGS (ALT 637 FOR MEDICARE OP, ALT 636 FOR OP/ED): Performed by: INTERNAL MEDICINE

## 2023-11-11 PROCEDURE — 82947 ASSAY GLUCOSE BLOOD QUANT: CPT

## 2023-11-11 PROCEDURE — 80048 BASIC METABOLIC PNL TOTAL CA: CPT | Performed by: INTERNAL MEDICINE

## 2023-11-11 PROCEDURE — 2500000004 HC RX 250 GENERAL PHARMACY W/ HCPCS (ALT 636 FOR OP/ED): Performed by: INTERNAL MEDICINE

## 2023-11-11 PROCEDURE — 2500000001 HC RX 250 WO HCPCS SELF ADMINISTERED DRUGS (ALT 637 FOR MEDICARE OP): Performed by: INTERNAL MEDICINE

## 2023-11-11 PROCEDURE — 2500000002 HC RX 250 W HCPCS SELF ADMINISTERED DRUGS (ALT 637 FOR MEDICARE OP, ALT 636 FOR OP/ED): Performed by: PHYSICIAN ASSISTANT

## 2023-11-11 PROCEDURE — 36415 COLL VENOUS BLD VENIPUNCTURE: CPT | Performed by: INTERNAL MEDICINE

## 2023-11-11 PROCEDURE — 99222 1ST HOSP IP/OBS MODERATE 55: CPT | Performed by: INTERNAL MEDICINE

## 2023-11-11 PROCEDURE — 81003 URINALYSIS AUTO W/O SCOPE: CPT | Performed by: INTERNAL MEDICINE

## 2023-11-11 PROCEDURE — 99223 1ST HOSP IP/OBS HIGH 75: CPT | Performed by: INTERNAL MEDICINE

## 2023-11-11 RX ORDER — HYDROXYZINE HYDROCHLORIDE 25 MG/1
25 TABLET, FILM COATED ORAL 3 TIMES DAILY PRN
Status: DISCONTINUED | OUTPATIENT
Start: 2023-11-11 | End: 2023-11-13 | Stop reason: HOSPADM

## 2023-11-11 RX ORDER — ONDANSETRON HYDROCHLORIDE 2 MG/ML
4 INJECTION, SOLUTION INTRAVENOUS EVERY 8 HOURS PRN
Status: DISCONTINUED | OUTPATIENT
Start: 2023-11-11 | End: 2023-11-13 | Stop reason: HOSPADM

## 2023-11-11 RX ORDER — INSULIN LISPRO 100 [IU]/ML
0-10 INJECTION, SOLUTION INTRAVENOUS; SUBCUTANEOUS
Status: DISCONTINUED | OUTPATIENT
Start: 2023-11-11 | End: 2023-11-13 | Stop reason: HOSPADM

## 2023-11-11 RX ORDER — ACETAMINOPHEN 160 MG/5ML
650 SOLUTION ORAL EVERY 4 HOURS PRN
Status: DISCONTINUED | OUTPATIENT
Start: 2023-11-11 | End: 2023-11-11

## 2023-11-11 RX ORDER — POTASSIUM CHLORIDE 20 MEQ/1
40 TABLET, EXTENDED RELEASE ORAL ONCE
Status: COMPLETED | OUTPATIENT
Start: 2023-11-11 | End: 2023-11-11

## 2023-11-11 RX ORDER — FERROUS SULFATE 325(65) MG
1 TABLET ORAL DAILY
Status: DISCONTINUED | OUTPATIENT
Start: 2023-11-11 | End: 2023-11-13 | Stop reason: HOSPADM

## 2023-11-11 RX ORDER — INSULIN GLARGINE 100 [IU]/ML
15 INJECTION, SOLUTION SUBCUTANEOUS NIGHTLY
Status: DISCONTINUED | OUTPATIENT
Start: 2023-11-11 | End: 2023-11-12

## 2023-11-11 RX ORDER — PANTOPRAZOLE SODIUM 20 MG/1
20 TABLET, DELAYED RELEASE ORAL
Status: DISCONTINUED | OUTPATIENT
Start: 2023-11-11 | End: 2023-11-13 | Stop reason: HOSPADM

## 2023-11-11 RX ORDER — ENOXAPARIN SODIUM 100 MG/ML
40 INJECTION SUBCUTANEOUS EVERY 24 HOURS
Status: DISCONTINUED | OUTPATIENT
Start: 2023-11-11 | End: 2023-11-13 | Stop reason: HOSPADM

## 2023-11-11 RX ORDER — DEXTROSE 50 % IN WATER (D50W) INTRAVENOUS SYRINGE
25
Status: DISCONTINUED | OUTPATIENT
Start: 2023-11-11 | End: 2023-11-13 | Stop reason: HOSPADM

## 2023-11-11 RX ORDER — IBUPROFEN 200 MG
400 TABLET ORAL EVERY 8 HOURS PRN
Status: DISPENSED | OUTPATIENT
Start: 2023-11-11 | End: 2023-11-13

## 2023-11-11 RX ORDER — ACETAMINOPHEN 325 MG/1
650 TABLET ORAL EVERY 4 HOURS PRN
Status: DISCONTINUED | OUTPATIENT
Start: 2023-11-11 | End: 2023-11-13 | Stop reason: HOSPADM

## 2023-11-11 RX ORDER — INSULIN LISPRO 100 [IU]/ML
5 INJECTION, SOLUTION INTRAVENOUS; SUBCUTANEOUS
Status: DISCONTINUED | OUTPATIENT
Start: 2023-11-11 | End: 2023-11-12

## 2023-11-11 RX ORDER — TAMSULOSIN HYDROCHLORIDE 0.4 MG/1
0.4 CAPSULE ORAL DAILY
Status: DISCONTINUED | OUTPATIENT
Start: 2023-11-11 | End: 2023-11-13 | Stop reason: HOSPADM

## 2023-11-11 RX ORDER — LISINOPRIL 20 MG/1
20 TABLET ORAL DAILY
Status: DISCONTINUED | OUTPATIENT
Start: 2023-11-11 | End: 2023-11-13 | Stop reason: HOSPADM

## 2023-11-11 RX ORDER — FLUTICASONE PROPIONATE 50 MCG
2 SPRAY, SUSPENSION (ML) NASAL DAILY
Status: DISCONTINUED | OUTPATIENT
Start: 2023-11-11 | End: 2023-11-13 | Stop reason: HOSPADM

## 2023-11-11 RX ORDER — SODIUM CHLORIDE 9 MG/ML
75 INJECTION, SOLUTION INTRAVENOUS CONTINUOUS
Status: DISCONTINUED | OUTPATIENT
Start: 2023-11-11 | End: 2023-11-13 | Stop reason: HOSPADM

## 2023-11-11 RX ORDER — OXYBUTYNIN CHLORIDE 5 MG/1
5 TABLET ORAL 2 TIMES DAILY
Status: DISCONTINUED | OUTPATIENT
Start: 2023-11-11 | End: 2023-11-13 | Stop reason: HOSPADM

## 2023-11-11 RX ORDER — ASPIRIN 81 MG/1
81 TABLET ORAL DAILY
Status: DISCONTINUED | OUTPATIENT
Start: 2023-11-11 | End: 2023-11-13 | Stop reason: HOSPADM

## 2023-11-11 RX ORDER — ATORVASTATIN CALCIUM 40 MG/1
40 TABLET, FILM COATED ORAL DAILY
Status: DISCONTINUED | OUTPATIENT
Start: 2023-11-11 | End: 2023-11-13 | Stop reason: HOSPADM

## 2023-11-11 RX ORDER — PIOGLITAZONEHYDROCHLORIDE 30 MG/1
45 TABLET ORAL DAILY
Status: DISCONTINUED | OUTPATIENT
Start: 2023-11-11 | End: 2023-11-13

## 2023-11-11 RX ORDER — DEXTROSE MONOHYDRATE 100 MG/ML
0.3 INJECTION, SOLUTION INTRAVENOUS ONCE AS NEEDED
Status: DISCONTINUED | OUTPATIENT
Start: 2023-11-11 | End: 2023-11-13 | Stop reason: HOSPADM

## 2023-11-11 RX ORDER — POLYETHYLENE GLYCOL 3350 17 G/17G
17 POWDER, FOR SOLUTION ORAL DAILY PRN
Status: DISCONTINUED | OUTPATIENT
Start: 2023-11-11 | End: 2023-11-13 | Stop reason: HOSPADM

## 2023-11-11 RX ORDER — METOPROLOL SUCCINATE 25 MG/1
25 TABLET, EXTENDED RELEASE ORAL DAILY
Status: DISCONTINUED | OUTPATIENT
Start: 2023-11-11 | End: 2023-11-13 | Stop reason: HOSPADM

## 2023-11-11 RX ADMIN — INSULIN LISPRO 12 UNITS: 100 INJECTION, SOLUTION INTRAVENOUS; SUBCUTANEOUS at 00:36

## 2023-11-11 RX ADMIN — SODIUM CHLORIDE 75 ML/HR: 9 INJECTION, SOLUTION INTRAVENOUS at 06:37

## 2023-11-11 RX ADMIN — INSULIN LISPRO 6 UNITS: 100 INJECTION, SOLUTION INTRAVENOUS; SUBCUTANEOUS at 16:28

## 2023-11-11 RX ADMIN — INSULIN LISPRO 6 UNITS: 100 INJECTION, SOLUTION INTRAVENOUS; SUBCUTANEOUS at 11:49

## 2023-11-11 RX ADMIN — OXYBUTYNIN CHLORIDE 5 MG: 5 TABLET ORAL at 08:33

## 2023-11-11 RX ADMIN — ATORVASTATIN CALCIUM 40 MG: 40 TABLET, FILM COATED ORAL at 08:34

## 2023-11-11 RX ADMIN — INSULIN GLARGINE 15 UNITS: 100 INJECTION, SOLUTION SUBCUTANEOUS at 21:42

## 2023-11-11 RX ADMIN — ACETAMINOPHEN 650 MG: 325 TABLET ORAL at 10:05

## 2023-11-11 RX ADMIN — TAMSULOSIN HYDROCHLORIDE 0.4 MG: 0.4 CAPSULE ORAL at 08:34

## 2023-11-11 RX ADMIN — PANTOPRAZOLE SODIUM 20 MG: 20 TABLET, DELAYED RELEASE ORAL at 06:37

## 2023-11-11 RX ADMIN — OXYBUTYNIN CHLORIDE 5 MG: 5 TABLET ORAL at 20:21

## 2023-11-11 RX ADMIN — ASPIRIN 81 MG: 81 TABLET, COATED ORAL at 08:34

## 2023-11-11 RX ADMIN — POTASSIUM CHLORIDE 40 MEQ: 1500 TABLET, EXTENDED RELEASE ORAL at 10:07

## 2023-11-11 RX ADMIN — FERROUS SULFATE TAB 325 MG (65 MG ELEMENTAL FE) 325 MG: 325 (65 FE) TAB at 08:33

## 2023-11-11 RX ADMIN — LISINOPRIL 20 MG: 20 TABLET ORAL at 08:33

## 2023-11-11 RX ADMIN — ACETAMINOPHEN 650 MG: 325 TABLET ORAL at 20:22

## 2023-11-11 RX ADMIN — INSULIN LISPRO 5 UNITS: 100 INJECTION, SOLUTION INTRAVENOUS; SUBCUTANEOUS at 16:28

## 2023-11-11 RX ADMIN — METOPROLOL SUCCINATE 25 MG: 25 TABLET, FILM COATED, EXTENDED RELEASE ORAL at 08:33

## 2023-11-11 RX ADMIN — IBUPROFEN 400 MG: 200 TABLET, FILM COATED ORAL at 10:05

## 2023-11-11 SDOH — SOCIAL STABILITY: SOCIAL INSECURITY: WERE YOU ABLE TO COMPLETE ALL THE BEHAVIORAL HEALTH SCREENINGS?: YES

## 2023-11-11 SDOH — SOCIAL STABILITY: SOCIAL INSECURITY: HAVE YOU HAD THOUGHTS OF HARMING ANYONE ELSE?: NO

## 2023-11-11 SDOH — SOCIAL STABILITY: SOCIAL INSECURITY: DOES ANYONE TRY TO KEEP YOU FROM HAVING/CONTACTING OTHER FRIENDS OR DOING THINGS OUTSIDE YOUR HOME?: NO

## 2023-11-11 SDOH — SOCIAL STABILITY: SOCIAL INSECURITY: HAS ANYONE EVER THREATENED TO HURT YOUR FAMILY OR YOUR PETS?: NO

## 2023-11-11 SDOH — SOCIAL STABILITY: SOCIAL INSECURITY: ARE YOU OR HAVE YOU BEEN THREATENED OR ABUSED PHYSICALLY, EMOTIONALLY, OR SEXUALLY BY ANYONE?: NO

## 2023-11-11 SDOH — SOCIAL STABILITY: SOCIAL INSECURITY: DO YOU FEEL UNSAFE GOING BACK TO THE PLACE WHERE YOU ARE LIVING?: NO

## 2023-11-11 SDOH — SOCIAL STABILITY: SOCIAL INSECURITY: ABUSE: ADULT

## 2023-11-11 SDOH — SOCIAL STABILITY: SOCIAL INSECURITY: ARE THERE ANY APPARENT SIGNS OF INJURIES/BEHAVIORS THAT COULD BE RELATED TO ABUSE/NEGLECT?: NO

## 2023-11-11 SDOH — SOCIAL STABILITY: SOCIAL INSECURITY: DO YOU FEEL ANYONE HAS EXPLOITED OR TAKEN ADVANTAGE OF YOU FINANCIALLY OR OF YOUR PERSONAL PROPERTY?: NO

## 2023-11-11 ASSESSMENT — ENCOUNTER SYMPTOMS
UNEXPECTED WEIGHT CHANGE: 1
CHEST TIGHTNESS: 0
SPEECH DIFFICULTY: 0
WEAKNESS: 0
NUMBNESS: 0
NAUSEA: 0
APPETITE CHANGE: 1
SHORTNESS OF BREATH: 0
VOMITING: 0
POLYDIPSIA: 0
ABDOMINAL PAIN: 0

## 2023-11-11 ASSESSMENT — LIFESTYLE VARIABLES
HOW MANY STANDARD DRINKS CONTAINING ALCOHOL DO YOU HAVE ON A TYPICAL DAY: PATIENT DOES NOT DRINK
AUDIT-C TOTAL SCORE: 0
REASON UNABLE TO ASSESS: NO
AUDIT-C TOTAL SCORE: 0
EVER HAD A DRINK FIRST THING IN THE MORNING TO STEADY YOUR NERVES TO GET RID OF A HANGOVER: NO
HAVE PEOPLE ANNOYED YOU BY CRITICIZING YOUR DRINKING: NO
HOW OFTEN DO YOU HAVE 6 OR MORE DRINKS ON ONE OCCASION: NEVER
SKIP TO QUESTIONS 9-10: 1
HOW OFTEN DO YOU HAVE A DRINK CONTAINING ALCOHOL: NEVER
HAVE YOU EVER FELT YOU SHOULD CUT DOWN ON YOUR DRINKING: NO
EVER FELT BAD OR GUILTY ABOUT YOUR DRINKING: NO

## 2023-11-11 ASSESSMENT — PAIN SCALES - GENERAL
PAINLEVEL_OUTOF10: 1
PAINLEVEL_OUTOF10: 0 - NO PAIN
PAINLEVEL_OUTOF10: 3

## 2023-11-11 ASSESSMENT — ACTIVITIES OF DAILY LIVING (ADL)
BATHING: INDEPENDENT
ADEQUATE_TO_COMPLETE_ADL: NO
LACK_OF_TRANSPORTATION: NO
FEEDING YOURSELF: INDEPENDENT
TOILETING: INDEPENDENT
WALKS IN HOME: INDEPENDENT
JUDGMENT_ADEQUATE_SAFELY_COMPLETE_DAILY_ACTIVITIES: NO
HEARING - LEFT EAR: FUNCTIONAL
DRESSING YOURSELF: INDEPENDENT
PATIENT'S MEMORY ADEQUATE TO SAFELY COMPLETE DAILY ACTIVITIES?: NO
GROOMING: INDEPENDENT
ASSISTIVE_DEVICE: EYEGLASSES
HEARING - RIGHT EAR: FUNCTIONAL

## 2023-11-11 ASSESSMENT — COGNITIVE AND FUNCTIONAL STATUS - GENERAL
PATIENT BASELINE BEDBOUND: NO
MOBILITY SCORE: 24
DAILY ACTIVITIY SCORE: 24

## 2023-11-11 ASSESSMENT — PATIENT HEALTH QUESTIONNAIRE - PHQ9
1. LITTLE INTEREST OR PLEASURE IN DOING THINGS: NOT AT ALL
SUM OF ALL RESPONSES TO PHQ9 QUESTIONS 1 & 2: 0
2. FEELING DOWN, DEPRESSED OR HOPELESS: NOT AT ALL

## 2023-11-11 ASSESSMENT — PAIN - FUNCTIONAL ASSESSMENT
PAIN_FUNCTIONAL_ASSESSMENT: 0-10

## 2023-11-11 ASSESSMENT — PAIN DESCRIPTION - LOCATION: LOCATION: JAW

## 2023-11-11 NOTE — PROGRESS NOTES
11/11/23 1232   Discharge Planning   Living Arrangements Children  (son and daughter in law live with patient)   Support Systems Children   Assistance Needed X3, independent in ADLs, drives, ambulates without assistive devices, no O2 at baseline   Type of Residence Private residence   Number of Stairs to Enter Residence 1   Number of Stairs Within Residence 13   Who is requesting discharge planning? Provider   Home or Post Acute Services None   Patient expects to be discharged to: Home with family, no needs- denied need for HHC   Does the patient need discharge transport arranged? No   Financial Resource Strain   How hard is it for you to pay for the very basics like food, housing, medical care, and heating? Not very   Housing Stability   In the last 12 months, was there a time when you were not able to pay the mortgage or rent on time? N   In the last 12 months, how many places have you lived? 1   In the last 12 months, was there a time when you did not have a steady place to sleep or slept in a shelter (including now)? N   Transportation Needs   In the past 12 months, has lack of transportation kept you from medical appointments or from getting medications? no   In the past 12 months, has lack of transportation kept you from meetings, work, or from getting things needed for daily living? No

## 2023-11-11 NOTE — ED PROVIDER NOTES
HPI   Chief Complaint   Patient presents with    Labs Only     BGL OFF reported high on meter. Told by PCP to to get checked.       This is a 78-year-old male coming in for high blood sugar.  See MDM      History provided by:  Patient and relative                      Alexandra Coma Scale Score: 15                  Patient History   Past Medical History:   Diagnosis Date    Personal history of malignant neoplasm of prostate 01/29/2020    History of malignant neoplasm of prostate    Personal history of other diseases of the circulatory system     History of hypotension    Personal history of other diseases of the musculoskeletal system and connective tissue     History of back pain    Personal history of other diseases of the nervous system and sense organs     History of tremor    Personal history of other endocrine, nutritional and metabolic disease     History of diabetes mellitus     Past Surgical History:   Procedure Laterality Date    OTHER SURGICAL HISTORY  07/31/2019    Lumbar discectomy    OTHER SURGICAL HISTORY  01/29/2020    Shoulder surgery    OTHER SURGICAL HISTORY  01/29/2020    Prostate biopsy     No family history on file.  Social History     Tobacco Use    Smoking status: Never    Smokeless tobacco: Never   Vaping Use    Vaping Use: Never used   Substance Use Topics    Alcohol use: Never    Drug use: Never       Physical Exam   ED Triage Vitals [11/10/23 2242]   Temp Heart Rate Resp BP   37 °C (98.6 °F) 93 18 163/75      SpO2 Temp Source Heart Rate Source Patient Position   95 % Tympanic Monitor Sitting      BP Location FiO2 (%)     Right arm --       Physical Exam  Vitals and nursing note reviewed.   Constitutional:       General: He is not in acute distress.     Appearance: Normal appearance. He is not toxic-appearing.   HENT:      Head: Normocephalic and atraumatic.      Nose: Nose normal.      Mouth/Throat:      Mouth: Mucous membranes are moist.      Pharynx: Oropharynx is clear.   Eyes:       Extraocular Movements: Extraocular movements intact.      Conjunctiva/sclera: Conjunctivae normal.      Pupils: Pupils are equal, round, and reactive to light.   Cardiovascular:      Rate and Rhythm: Regular rhythm.      Pulses: Normal pulses.      Heart sounds: Normal heart sounds.   Pulmonary:      Effort: Pulmonary effort is normal. No respiratory distress.      Breath sounds: Normal breath sounds.   Abdominal:      General: Abdomen is flat. Bowel sounds are normal.      Palpations: Abdomen is soft.      Tenderness: There is no abdominal tenderness.   Musculoskeletal:         General: Normal range of motion.      Cervical back: Normal range of motion and neck supple.   Skin:     General: Skin is warm and dry.      Coloration: Skin is not jaundiced or pale.      Findings: No bruising.   Neurological:      General: No focal deficit present.      Mental Status: He is alert and oriented to person, place, and time. Mental status is at baseline.   Psychiatric:         Mood and Affect: Mood normal.         Behavior: Behavior normal.         ED Course & MDM        Medical Decision Making  Summary:  Medical Decision Making:   Patient presented as described in HPI. Patient case including ROS, PE, and treatment and plan discussed with ED attending if attached as cosigner. Results from labs and or imaging included below if completed. Eleno Glaser  is a 78 y.o. coming in for Patient presents with:  Labs Only: BGL OFF reported high on meter. Told by PCP to to get checked.  .  Is a 78-year-old male coming in for hyperglycemia.  Patient had labs earlier today by PCP who contacted him and stated that his blood sugar was elevated.  Patient has had recent hyper glycemic meds changed.  He has been on oral medication however is not on any insulin.  He states that he otherwise has no complaints.  He denies any frequent urination.  He denies any lightheadedness dizziness.  He has not had any vomiting or diarrhea.  Patient reports no  chest pain shortness of breath or any other symptoms.  Patient was found to have severe hyperglycemia.  He was given IV fluids as well as 12 units of insulin subcutaneously.  Patient pH is not acidotic.  Sodium is low however likely related to blood sugar.  No other acute concerning findings.  Patient will require hospitalization.  Patient staffed with ER attending who will talk to the hospitalist and place possible further interventions.    I also explained the plan and treatment course. Patient/guardian is in agreement with plan, treatment course, and states verbally that they will comply.    Labs Reviewed  CBC WITH AUTO DIFFERENTIAL - Abnormal     WBC                           4.6                    nRBC                          0.0                    RBC                           4.60                   Hemoglobin                    13.5                   Hematocrit                    40.6 (*)               MCV                           88                     MCH                           29.3                   MCHC                          33.3                   RDW                           13.6                   Platelets                     266                    Neutrophils %                 67.1                   Immature Granulocytes %, Automated   1.5 (*)                Lymphocytes %                 15.2                   Monocytes %                   10.3                   Eosinophils %                 4.8                    Basophils %                   1.1                    Neutrophils Absolute          3.05                   Immature Granulocytes Absolute, Au*   0.07                   Lymphocytes Absolute          0.69 (*)               Monocytes Absolute            0.47                   Eosinophils Absolute          0.22                   Basophils Absolute            0.05                COMPREHENSIVE METABOLIC PANEL - Abnormal     Glucose                       700 (*)                Sodium                         125 (*)                Potassium                     4.4                    Chloride                      90 (*)                 Bicarbonate                   22                     Anion Gap                     17                     Urea Nitrogen                 16                     Creatinine                    1.27                   eGFR                          58 (*)                 Calcium                       9.5                    Albumin                       4.0                    Alkaline Phosphatase          64                     Total Protein                 6.6                    AST                           13                     Bilirubin, Total              0.5                    ALT                           17                  BLOOD GAS VENOUS FULL PANEL - Abnormal     POCT pH, Venous               7.47 (*)               POCT pCO2, Venous             37 (*)                 POCT pO2, Venous              54 (*)                 POCT SO2, Venous              89 (*)                 POCT Oxy Hemoglobin, Venous   86.0 (*)               POCT Hematocrit Calculated, Venous   39.0 (*)               POCT Sodium, Venous           125 (*)                POCT Potassium, Venous        4.5                    POCT Chloride, Venous         92 (*)                 POCT Ionized Calicum, Venous   1.25                   POCT Glucose, Venous          >685 (*)               POCT Lactate, Venous          1.3                    POCT Base Excess, Venous      3.2 (*)                POCT HCO3 Calculated, Venous   26.9 (*)               POCT Hemoglobin, Venous       13.0 (*)               POCT Anion Gap, Venous        11.0                   Patient Temperature                                  FiO2                          0                   POCT GLUCOSE - Abnormal     POCT Glucose                  >600 (*)            MAGNESIUM - Normal   No orders to display       Tests/Medications/Escalations of Care  considered but not given: As in MDM    Patient care discussed with: N/A  Social Determinants affecting care: N/A    Final diagnosis and disposition as documented       Hospitalize. I discussed the differential; results and admit plan with the patient and/or family/friend/caregiver if present.  I emphasized the importance of hospitalization need for re-evaluation/continued monitoring/interventions.. They agreed that if they feel their condition is worsening or if they have any other concern they should alert staff immediately for further assistance. I gave the patient an opportunity to ask all questions they had and answered all of them accordingly. The patient and/or family/friend/caregiver expressed understanding verbally and that they would comply.    Disposition:  Hospitalize to _ floor under Dr. _ per their orders. Discussed findings and treatment done here in ED with admitting physician. It would be a risk to discharge the patient in their condition due to possibility of deterioration in their condition and the need for urgent interventions.    This note has been transcribed using voice recognition and may contain grammatical errors, misplaced words, incorrect words, incorrect phrases or other errors.          Procedure  Procedures     Jon Sanders PA-C  11/11/23 0125

## 2023-11-11 NOTE — PROGRESS NOTES
Eleno Glaser is a 78 y.o. male on day 0 of admission presenting with Hyperglycemia due to diabetes mellitus (CMS/[facility]).    Please review night physician H&P for today's notes.  Patient was sent from primary care physician's office due to consistent hyperglycemia.  At the time of admission blood sugars were 489-trending up, max was in 700s.  This morning blood sugars improved to 130s.  Continue sliding scale, home diabetic medications-pioglitazone 45 mg daily, semaglutide on hold.  Awaiting endocrinology consult/recommendations.    Follow-up CBC BMP ordered.  Continue IV fluids.  Continue symptomatic management.

## 2023-11-11 NOTE — CONSULTS
"Inpatient consult to Endocrinology  Consult performed by: Tammy Guan MD  Consult ordered by: Sudhir Guan MD  Reason for consult: Hyperlgycemia          Reason For Consult  Hyperglycemia     History Of Present Illness  Eleno Glaser is a 78 y.o. male presenting on the account of hypoglycemia.  The patient states that his primary care physician was trying to contact him as he had routine blood tests 4 days ago.  He states that he was checking his glucose values at home and his machine just read \"hi\".  Initial biochemical detail revealed a profound hyperglycemia without any evidence of diabetic ketoacidosis.  The patient was given 12 units of insulin and started on IV fluids.    An endocrinology consult has been placed given his degree of hyperglycemia.  The patient states that he was diagnosed back in the 1980s.  He denies seeing an endocrinologist and his disease is currently managed by his PCP, who he did not see for over one year.  His home regimen consists of:  Pioglitazone 45 mg once daily  Rybelsus 3 mg once daily was started 3 days ago in hopes of increasing the dose to 7 mg    He states that he was previously on Levemir but cannot recall the dose.  This was discontinued and he was started on Trulicity back in 2016.  His Trulicity was recently discontinued as \"it was not doing anything\".   He is unable to take metformin due to dyspepsia.    His hemoglobin A1c was found to be 17.6%.    The patient states that he infrequently checks his glucose values at home.  When he does, he tends to see glucose values ranging between 200 to 300 mg/dL in the fasting state.    He admits to losing weight over the last year of 25 to 30 pounds.  He denies increased thirst.  He admits to a dry which he attributes to side effects from some of his medications.  He admits to nocturia x2-5 which has remained unchanged.  His vision has been blurry for the past 6 to 7 months.      Past Medical History  Diabetes " mellitus type 2, Rojas's esophagus, hypertension, depression, hyperlipidemia, cervical spinal cord compression    Surgical History  He has a past surgical history that includes Other surgical history (07/31/2019); Other surgical history (01/29/2020); and Other surgical history (01/29/2020).     Social History  He reports that he has never smoked. He has never used smokeless tobacco. He reports that he does not drink alcohol and does not use drugs.    Family History  Both parents were diabetic     Allergies  Patient has no known allergies.    Review of Systems   Constitutional:  Positive for appetite change and unexpected weight change.   HENT:  Positive for hearing loss.    Eyes:  Positive for visual disturbance.   Respiratory:  Negative for chest tightness and shortness of breath.    Cardiovascular:  Negative for chest pain and leg swelling.   Gastrointestinal:  Negative for abdominal pain, nausea and vomiting.   Endocrine: Negative for polydipsia and polyuria.   Neurological:  Negative for speech difficulty, weakness and numbness.   Psychiatric/Behavioral:  Positive for self-injury.         Physical Exam  Nursing note reviewed.   Constitutional:       General: He is not in acute distress.     Appearance: Normal appearance. He is normal weight.   HENT:      Head: Normocephalic and atraumatic.      Nose: Nose normal.      Mouth/Throat:      Mouth: Mucous membranes are dry.   Eyes:      Extraocular Movements: Extraocular movements intact.   Cardiovascular:      Rate and Rhythm: Normal rate and regular rhythm.   Pulmonary:      Effort: Pulmonary effort is normal.   Abdominal:      General: Bowel sounds are normal.      Palpations: Abdomen is soft.   Musculoskeletal:         General: Normal range of motion.   Skin:     General: Skin is warm.   Neurological:      Mental Status: He is alert and oriented to person, place, and time.   Psychiatric:         Mood and Affect: Mood normal.          Last Recorded Vitals  Blood  "pressure 106/68, pulse 72, temperature 36.7 °C (98.1 °F), temperature source Temporal, resp. rate 16, height 1.727 m (5' 8\"), weight 71 kg (156 lb 8.4 oz), SpO2 96 %.    Relevant Results  Scheduled medications  aspirin, 81 mg, oral, Daily  atorvastatin, 40 mg, oral, Daily  enoxaparin, 40 mg, subcutaneous, q24h  ferrous sulfate (325 mg ferrous sulfate), 1 tablet, oral, Daily  fluticasone, 2 spray, Each Nostril, Daily  insulin lispro, 0-10 Units, subcutaneous, TID with meals  lisinopril, 20 mg, oral, Daily  metoprolol succinate XL, 25 mg, oral, Daily  oxybutynin, 5 mg, oral, BID  pantoprazole, 20 mg, oral, Daily before breakfast  pioglitazone, 45 mg, oral, Daily  [Held by provider] semaglutide, 7 mg, oral, Daily  tamsulosin, 0.4 mg, oral, Daily      Continuous medications  sodium chloride 0.9%, 75 mL/hr, Last Rate: 75 mL/hr (11/11/23 0637)      PRN medications  PRN medications: acetaminophen, dextrose 10 % in water (D10W), dextrose, glucagon, hydrOXYzine HCL, ibuprofen, ondansetron, polyethylene glycol     Results for orders placed or performed during the hospital encounter of 11/10/23 (from the past 24 hour(s))   POCT GLUCOSE   Result Value Ref Range    POCT Glucose >600 (H) 74 - 99 mg/dL   CBC and Auto Differential   Result Value Ref Range    WBC 4.6 4.4 - 11.3 x10*3/uL    nRBC 0.0 0.0 - 0.0 /100 WBCs    RBC 4.60 4.50 - 5.90 x10*6/uL    Hemoglobin 13.5 13.5 - 17.5 g/dL    Hematocrit 40.6 (L) 41.0 - 52.0 %    MCV 88 80 - 100 fL    MCH 29.3 26.0 - 34.0 pg    MCHC 33.3 32.0 - 36.0 g/dL    RDW 13.6 11.5 - 14.5 %    Platelets 266 150 - 450 x10*3/uL    Neutrophils % 67.1 40.0 - 80.0 %    Immature Granulocytes %, Automated 1.5 (H) 0.0 - 0.9 %    Lymphocytes % 15.2 13.0 - 44.0 %    Monocytes % 10.3 2.0 - 10.0 %    Eosinophils % 4.8 0.0 - 6.0 %    Basophils % 1.1 0.0 - 2.0 %    Neutrophils Absolute 3.05 1.60 - 5.50 x10*3/uL    Immature Granulocytes Absolute, Automated 0.07 0.00 - 0.50 x10*3/uL    Lymphocytes Absolute 0.69 " (L) 0.80 - 3.00 x10*3/uL    Monocytes Absolute 0.47 0.05 - 0.80 x10*3/uL    Eosinophils Absolute 0.22 0.00 - 0.40 x10*3/uL    Basophils Absolute 0.05 0.00 - 0.10 x10*3/uL   Comprehensive metabolic panel   Result Value Ref Range    Glucose 700 (HH) 74 - 99 mg/dL    Sodium 125 (L) 136 - 145 mmol/L    Potassium 4.4 3.5 - 5.3 mmol/L    Chloride 90 (L) 98 - 107 mmol/L    Bicarbonate 22 21 - 32 mmol/L    Anion Gap 17 mmol/L    Urea Nitrogen 16 6 - 23 mg/dL    Creatinine 1.27 0.50 - 1.30 mg/dL    eGFR 58 (L) >60 mL/min/1.73m*2    Calcium 9.5 8.6 - 10.3 mg/dL    Albumin 4.0 3.4 - 5.0 g/dL    Alkaline Phosphatase 64 33 - 136 U/L    Total Protein 6.6 6.4 - 8.2 g/dL    AST 13 9 - 39 U/L    Bilirubin, Total 0.5 0.0 - 1.2 mg/dL    ALT 17 10 - 52 U/L   Magnesium   Result Value Ref Range    Magnesium 1.64 1.60 - 2.40 mg/dL   Blood Gas Venous Full Panel   Result Value Ref Range    POCT pH, Venous 7.47 (H) 7.33 - 7.43 pH    POCT pCO2, Venous 37 (L) 41 - 51 mm Hg    POCT pO2, Venous 54 (H) 35 - 45 mm Hg    POCT SO2, Venous 89 (H) 45 - 75 %    POCT Oxy Hemoglobin, Venous 86.0 (H) 45.0 - 75.0 %    POCT Hematocrit Calculated, Venous 39.0 (L) 41.0 - 52.0 %    POCT Sodium, Venous 125 (L) 136 - 145 mmol/L    POCT Potassium, Venous 4.5 3.5 - 5.3 mmol/L    POCT Chloride, Venous 92 (L) 98 - 107 mmol/L    POCT Ionized Calicum, Venous 1.25 1.10 - 1.33 mmol/L    POCT Glucose, Venous >685 (HH) 74 - 99 mg/dL    POCT Lactate, Venous 1.3 0.4 - 2.0 mmol/L    POCT Base Excess, Venous 3.2 (H) -2.0 - 3.0 mmol/L    POCT HCO3 Calculated, Venous 26.9 (H) 22.0 - 26.0 mmol/L    POCT Hemoglobin, Venous 13.0 (L) 13.5 - 17.5 g/dL    POCT Anion Gap, Venous 11.0 10.0 - 25.0 mmol/L    Patient Temperature      FiO2 0 %   POCT GLUCOSE   Result Value Ref Range    POCT Glucose 474 (H) 74 - 99 mg/dL   POCT GLUCOSE   Result Value Ref Range    POCT Glucose 258 (H) 74 - 99 mg/dL   Basic metabolic panel   Result Value Ref Range    Glucose 130 (H) 74 - 99 mg/dL    Sodium  137 136 - 145 mmol/L    Potassium 3.4 (L) 3.5 - 5.3 mmol/L    Chloride 102 98 - 107 mmol/L    Bicarbonate 26 21 - 32 mmol/L    Anion Gap 12 10 - 20 mmol/L    Urea Nitrogen 13 6 - 23 mg/dL    Creatinine 0.99 0.50 - 1.30 mg/dL    eGFR 78 >60 mL/min/1.73m*2    Calcium 9.1 8.6 - 10.3 mg/dL   POCT GLUCOSE   Result Value Ref Range    POCT Glucose 254 (H) 74 - 99 mg/dL        Assessment/Plan     78-year-old male admitted on the account of profound hyperglycemia.  There was no evidence of diabetic ketoacidosis on biochemical data.  She he was previously on insulin therapy 7 years ago but this was discontinued and exchanged for a GLP-1 agonist.  His hemoglobin A1c was found to be 17.6%.    Recommendations:  To commence Lantus 15 units subcutaneous bedtime  To commence Humalog 5 units TID AC   To continue insulin correction scale TID AC   To continue Pioglitazone 45mg once daily   Diabetic diet as tolerated   [facility]    Hypoglycemic protocol   Consult to help with his hemoglobin A1c being at 17.6%, his outpatient regimen will likely be ineffective without the assistance of insulin  Counseled that the hemoglobin A1c should be between 7 to 8% as a goal  Patient is not opposed to outpatient insulin therapy  To continue IV fluid normal saline at 75 mL/h as his oromucosa is still quite dry  Will continue to follow    Thank you for the courtesy of this consult     Tammy Guan MD

## 2023-11-11 NOTE — H&P
History Of Present Illness  Eleno Glaser is a 78 y.o. male with a past medical history of diabetes mellitus type 2, Rojas's esophagus, hypertension, depression, hyperlipidemia, cervical spinal cord compression who was admitted to the hospital for hyperglycemia.  Patient was having high blood sugar at home.  His primary care physician told him to come to the emergency room.  He states that he was on Trulicity which was not working therefore he was switched to semaglutide on Wednesday.  He took it on Wednesday, Thursday and Friday.  He is also taking pioglitazone.  He denies any change in his diet.  He currently lives at home.  His son and daughter-in-law lives with him.  He still takes care of his activities of daily living.  He denies nausea, vomiting, abdominal pain, fever, chills, pain or burning on urination, diarrhea, melena, hematochezia, hematuria, chest pain, difficulty breathing, headache, dizziness, focal weakness or skin rash.     Past Medical History  He has a past medical history of Personal history of malignant neoplasm of prostate (2020), Personal history of other diseases of the circulatory system, Personal history of other diseases of the musculoskeletal system and connective tissue, Personal history of other diseases of the nervous system and sense organs, and Personal history of other endocrine, nutritional and metabolic disease.    Surgical History  He has a past surgical history that includes Other surgical history (2019); Other surgical history (2020); and Other surgical history (2020).  Status post seeding implant for prostate cancer, discectomy, shoulder surgery, multiple teeth removal.     Social History  Denies smoking, G-tube use or illicit drug use.    Family History  Significant for father who  of CHF in mother with  of dementia     Allergies  Patient has no known allergies.    Medications  Scheduled medications  aspirin, 81 mg, oral,  Daily  atorvastatin, 40 mg, oral, Daily  enoxaparin, 40 mg, subcutaneous, q24h  ferrous sulfate (325 mg ferrous sulfate), 1 tablet, oral, Daily  fluticasone, 2 spray, Each Nostril, Daily  lisinopril, 20 mg, oral, Daily  metoprolol succinate XL, 25 mg, oral, Daily  oxybutynin, 5 mg, oral, BID  pantoprazole, 20 mg, oral, Daily before breakfast  pioglitazone, 45 mg, oral, Daily  semaglutide, 7 mg, oral, Daily  tamsulosin, 0.4 mg, oral, Daily      Continuous medications     PRN medications  PRN medications: acetaminophen, hydrOXYzine HCL, ondansetron, polyethylene glycol    Review of systems: 10-point review of systems is negative.     Physical Exam  Constitutional: alert and oriented x 3, awake, cooperative, no acute distress  Skin: warm and dry  Head/Neck: Normocephalic, atraumatic  Eyes: clear sclera  ENMT: mucous membranes moist  Cardio: Regular rate and rhythm  Resp: CTA bilaterally, good respiratory effort  Gastrointestinal: Soft, nontender, nondistended, bowel sounds are present  Musculoskeletal: ROM intact, no joint swelling  Extremities: No edema, cyanosis, or clubbing  Neuro: lert and oriented x 3, sensation is intact.  Patient moves all limbs against resistance.  Psychological: Appropriate mood and behavior     Last Recorded Vitals  /84   Pulse 86   Temp 36.8 °C (98.2 °F) (Temporal)   Resp 16   Wt 71 kg (156 lb 8.4 oz)   SpO2 97%     Relevant Results  Results for orders placed or performed during the hospital encounter of 11/10/23 (from the past 24 hour(s))   POCT GLUCOSE   Result Value Ref Range    POCT Glucose >600 (H) 74 - 99 mg/dL   CBC and Auto Differential   Result Value Ref Range    WBC 4.6 4.4 - 11.3 x10*3/uL    nRBC 0.0 0.0 - 0.0 /100 WBCs    RBC 4.60 4.50 - 5.90 x10*6/uL    Hemoglobin 13.5 13.5 - 17.5 g/dL    Hematocrit 40.6 (L) 41.0 - 52.0 %    MCV 88 80 - 100 fL    MCH 29.3 26.0 - 34.0 pg    MCHC 33.3 32.0 - 36.0 g/dL    RDW 13.6 11.5 - 14.5 %    Platelets 266 150 - 450 x10*3/uL     Neutrophils % 67.1 40.0 - 80.0 %    Immature Granulocytes %, Automated 1.5 (H) 0.0 - 0.9 %    Lymphocytes % 15.2 13.0 - 44.0 %    Monocytes % 10.3 2.0 - 10.0 %    Eosinophils % 4.8 0.0 - 6.0 %    Basophils % 1.1 0.0 - 2.0 %    Neutrophils Absolute 3.05 1.60 - 5.50 x10*3/uL    Immature Granulocytes Absolute, Automated 0.07 0.00 - 0.50 x10*3/uL    Lymphocytes Absolute 0.69 (L) 0.80 - 3.00 x10*3/uL    Monocytes Absolute 0.47 0.05 - 0.80 x10*3/uL    Eosinophils Absolute 0.22 0.00 - 0.40 x10*3/uL    Basophils Absolute 0.05 0.00 - 0.10 x10*3/uL   Comprehensive metabolic panel   Result Value Ref Range    Glucose 700 (HH) 74 - 99 mg/dL    Sodium 125 (L) 136 - 145 mmol/L    Potassium 4.4 3.5 - 5.3 mmol/L    Chloride 90 (L) 98 - 107 mmol/L    Bicarbonate 22 21 - 32 mmol/L    Anion Gap 17 mmol/L    Urea Nitrogen 16 6 - 23 mg/dL    Creatinine 1.27 0.50 - 1.30 mg/dL    eGFR 58 (L) >60 mL/min/1.73m*2    Calcium 9.5 8.6 - 10.3 mg/dL    Albumin 4.0 3.4 - 5.0 g/dL    Alkaline Phosphatase 64 33 - 136 U/L    Total Protein 6.6 6.4 - 8.2 g/dL    AST 13 9 - 39 U/L    Bilirubin, Total 0.5 0.0 - 1.2 mg/dL    ALT 17 10 - 52 U/L   Magnesium   Result Value Ref Range    Magnesium 1.64 1.60 - 2.40 mg/dL   Blood Gas Venous Full Panel   Result Value Ref Range    POCT pH, Venous 7.47 (H) 7.33 - 7.43 pH    POCT pCO2, Venous 37 (L) 41 - 51 mm Hg    POCT pO2, Venous 54 (H) 35 - 45 mm Hg    POCT SO2, Venous 89 (H) 45 - 75 %    POCT Oxy Hemoglobin, Venous 86.0 (H) 45.0 - 75.0 %    POCT Hematocrit Calculated, Venous 39.0 (L) 41.0 - 52.0 %    POCT Sodium, Venous 125 (L) 136 - 145 mmol/L    POCT Potassium, Venous 4.5 3.5 - 5.3 mmol/L    POCT Chloride, Venous 92 (L) 98 - 107 mmol/L    POCT Ionized Calicum, Venous 1.25 1.10 - 1.33 mmol/L    POCT Glucose, Venous >685 (HH) 74 - 99 mg/dL    POCT Lactate, Venous 1.3 0.4 - 2.0 mmol/L    POCT Base Excess, Venous 3.2 (H) -2.0 - 3.0 mmol/L    POCT HCO3 Calculated, Venous 26.9 (H) 22.0 - 26.0 mmol/L    POCT  Hemoglobin, Venous 13.0 (L) 13.5 - 17.5 g/dL    POCT Anion Gap, Venous 11.0 10.0 - 25.0 mmol/L    Patient Temperature      FiO2 0 %   POCT GLUCOSE   Result Value Ref Range    POCT Glucose 474 (H) 74 - 99 mg/dL   POCT GLUCOSE   Result Value Ref Range    POCT Glucose 258 (H) 74 - 99 mg/dL     No results found.     Assessment/Plan   Principal Problem:    Hyperglycemia due to diabetes mellitus (CMS/[facility])    Diabetes mellitus type 2 with hyperglycemia  -Hemoglobin A1c 17.6  -He is no longer taking Trulicity.    -He has been on semaglutide for the last 3 days.    -Please note that semaglutide is not in our formulary.  -He claims compliance to diabetic diet  -He is awake, alert and oriented with no GI symptoms despite this hyperglycemic state  -He is hyponatremic and hypochloremic  -We will restart pioglitazone  -We will start sliding scale insulin  -Consult endocrinology  -Follow-up morning labs.  Follow-up urinalysis with reflex urine culture.    Hypertension  -Blood pressure is elevated but acceptable  -Resume lisinopril and metoprolol    BPH/history of prostate cancer   -status post seeding implant  -Resume Flomax    Rojas's esophagus   -Patient Protonix    Dental caries  -He states that he needs a few more teeth removed  -Follow-up with dentist as outpatient    DVT prophylaxis  -Lovenox    CODE STATUS: Full code    Sudhir Guan MD

## 2023-11-11 NOTE — PROGRESS NOTES
Received critical glucose of 690 form the lab at 9 pm.   Attempt to call patient unsuccessful no answer and mailbox full/  Attempt to call son.  No answer left message to return my call regarding emergency for his father.

## 2023-11-12 PROBLEM — R73.9 HYPERGLYCEMIA: Status: ACTIVE | Noted: 2023-11-12

## 2023-11-12 LAB
GLUCOSE BLD MANUAL STRIP-MCNC: 218 MG/DL (ref 74–99)
GLUCOSE BLD MANUAL STRIP-MCNC: 221 MG/DL (ref 74–99)
GLUCOSE BLD MANUAL STRIP-MCNC: 302 MG/DL (ref 74–99)
GLUCOSE BLD MANUAL STRIP-MCNC: 98 MG/DL (ref 74–99)

## 2023-11-12 PROCEDURE — 99232 SBSQ HOSP IP/OBS MODERATE 35: CPT | Performed by: INTERNAL MEDICINE

## 2023-11-12 PROCEDURE — 2500000002 HC RX 250 W HCPCS SELF ADMINISTERED DRUGS (ALT 637 FOR MEDICARE OP, ALT 636 FOR OP/ED): Performed by: INTERNAL MEDICINE

## 2023-11-12 PROCEDURE — 2500000001 HC RX 250 WO HCPCS SELF ADMINISTERED DRUGS (ALT 637 FOR MEDICARE OP): Performed by: INTERNAL MEDICINE

## 2023-11-12 PROCEDURE — 1210000001 HC SEMI-PRIVATE ROOM DAILY

## 2023-11-12 PROCEDURE — 2500000004 HC RX 250 GENERAL PHARMACY W/ HCPCS (ALT 636 FOR OP/ED): Performed by: INTERNAL MEDICINE

## 2023-11-12 PROCEDURE — 82947 ASSAY GLUCOSE BLOOD QUANT: CPT

## 2023-11-12 PROCEDURE — 96372 THER/PROPH/DIAG INJ SC/IM: CPT | Performed by: INTERNAL MEDICINE

## 2023-11-12 RX ORDER — INSULIN LISPRO 100 [IU]/ML
8 INJECTION, SOLUTION INTRAVENOUS; SUBCUTANEOUS
Status: DISCONTINUED | OUTPATIENT
Start: 2023-11-12 | End: 2023-11-13 | Stop reason: HOSPADM

## 2023-11-12 RX ORDER — INSULIN GLARGINE 100 [IU]/ML
20 INJECTION, SOLUTION SUBCUTANEOUS NIGHTLY
Status: DISCONTINUED | OUTPATIENT
Start: 2023-11-12 | End: 2023-11-13

## 2023-11-12 RX ADMIN — FERROUS SULFATE TAB 325 MG (65 MG ELEMENTAL FE) 325 MG: 325 (65 FE) TAB at 08:10

## 2023-11-12 RX ADMIN — FLUTICASONE PROPIONATE 2 SPRAY: 50 SPRAY, METERED NASAL at 08:11

## 2023-11-12 RX ADMIN — SODIUM CHLORIDE 75 ML/HR: 9 INJECTION, SOLUTION INTRAVENOUS at 23:58

## 2023-11-12 RX ADMIN — TAMSULOSIN HYDROCHLORIDE 0.4 MG: 0.4 CAPSULE ORAL at 08:10

## 2023-11-12 RX ADMIN — OXYBUTYNIN CHLORIDE 5 MG: 5 TABLET ORAL at 08:10

## 2023-11-12 RX ADMIN — INSULIN LISPRO 5 UNITS: 100 INJECTION, SOLUTION INTRAVENOUS; SUBCUTANEOUS at 07:39

## 2023-11-12 RX ADMIN — INSULIN LISPRO 4 UNITS: 100 INJECTION, SOLUTION INTRAVENOUS; SUBCUTANEOUS at 07:40

## 2023-11-12 RX ADMIN — IBUPROFEN 400 MG: 200 TABLET, FILM COATED ORAL at 16:28

## 2023-11-12 RX ADMIN — INSULIN LISPRO 8 UNITS: 100 INJECTION, SOLUTION INTRAVENOUS; SUBCUTANEOUS at 12:10

## 2023-11-12 RX ADMIN — OXYBUTYNIN CHLORIDE 5 MG: 5 TABLET ORAL at 20:41

## 2023-11-12 RX ADMIN — ACETAMINOPHEN 650 MG: 325 TABLET ORAL at 07:50

## 2023-11-12 RX ADMIN — LISINOPRIL 20 MG: 20 TABLET ORAL at 08:10

## 2023-11-12 RX ADMIN — PANTOPRAZOLE SODIUM 20 MG: 20 TABLET, DELAYED RELEASE ORAL at 08:10

## 2023-11-12 RX ADMIN — INSULIN GLARGINE 20 UNITS: 100 INJECTION, SOLUTION SUBCUTANEOUS at 20:40

## 2023-11-12 RX ADMIN — ATORVASTATIN CALCIUM 40 MG: 40 TABLET, FILM COATED ORAL at 08:10

## 2023-11-12 RX ADMIN — INSULIN LISPRO 8 UNITS: 100 INJECTION, SOLUTION INTRAVENOUS; SUBCUTANEOUS at 16:28

## 2023-11-12 RX ADMIN — ENOXAPARIN SODIUM 40 MG: 40 INJECTION SUBCUTANEOUS at 08:10

## 2023-11-12 RX ADMIN — ASPIRIN 81 MG: 81 TABLET, COATED ORAL at 08:10

## 2023-11-12 RX ADMIN — IBUPROFEN 400 MG: 200 TABLET, FILM COATED ORAL at 07:50

## 2023-11-12 RX ADMIN — METOPROLOL SUCCINATE 25 MG: 25 TABLET, FILM COATED, EXTENDED RELEASE ORAL at 08:09

## 2023-11-12 RX ADMIN — INSULIN LISPRO 4 UNITS: 100 INJECTION, SOLUTION INTRAVENOUS; SUBCUTANEOUS at 16:29

## 2023-11-12 RX ADMIN — ACETAMINOPHEN 650 MG: 325 TABLET ORAL at 16:27

## 2023-11-12 RX ADMIN — PIOGLITAZONE HYDROCHLORIDE 45 MG: 30 TABLET ORAL at 08:09

## 2023-11-12 SDOH — ECONOMIC STABILITY: FOOD INSECURITY: WITHIN THE PAST 12 MONTHS, YOU WORRIED THAT YOUR FOOD WOULD RUN OUT BEFORE YOU GOT MONEY TO BUY MORE.: NEVER TRUE

## 2023-11-12 SDOH — ECONOMIC STABILITY: HOUSING INSECURITY
IN THE LAST 12 MONTHS, WAS THERE A TIME WHEN YOU DID NOT HAVE A STEADY PLACE TO SLEEP OR SLEPT IN A SHELTER (INCLUDING NOW)?: NO

## 2023-11-12 SDOH — ECONOMIC STABILITY: FOOD INSECURITY: WITHIN THE PAST 12 MONTHS, THE FOOD YOU BOUGHT JUST DIDN'T LAST AND YOU DIDN'T HAVE MONEY TO GET MORE.: NEVER TRUE

## 2023-11-12 SDOH — ECONOMIC STABILITY: TRANSPORTATION INSECURITY: IN THE PAST 12 MONTHS, HAS LACK OF TRANSPORTATION KEPT YOU FROM MEDICAL APPOINTMENTS OR FROM GETTING MEDICATIONS?: NO

## 2023-11-12 SDOH — ECONOMIC STABILITY: FOOD INSECURITY: WITHIN THE PAST 12 MONTHS, YOU WORRIED THAT YOUR FOOD WOULD RUN OUT BEFORE YOU GOT THE MONEY TO BUY MORE.: NEVER TRUE

## 2023-11-12 SDOH — ECONOMIC STABILITY: INCOME INSECURITY: IN THE LAST 12 MONTHS, WAS THERE A TIME WHEN YOU WERE NOT ABLE TO PAY THE MORTGAGE OR RENT ON TIME?: NO

## 2023-11-12 SDOH — ECONOMIC STABILITY: TRANSPORTATION INSECURITY
IN THE PAST 12 MONTHS, HAS THE LACK OF TRANSPORTATION KEPT YOU FROM MEDICAL APPOINTMENTS OR FROM GETTING MEDICATIONS?: NO

## 2023-11-12 SDOH — ECONOMIC STABILITY: FOOD INSECURITY

## 2023-11-12 SDOH — ECONOMIC STABILITY: HOUSING INSECURITY: IN THE LAST 12 MONTHS, WAS THERE A TIME WHEN YOU WERE NOT ABLE TO PAY THE MORTGAGE OR RENT ON TIME?: NO

## 2023-11-12 SDOH — ECONOMIC STABILITY: HOUSING INSECURITY

## 2023-11-12 SDOH — ECONOMIC STABILITY: GENERAL

## 2023-11-12 SDOH — ECONOMIC STABILITY: HOUSING INSECURITY: IN THE LAST 12 MONTHS, HOW MANY PLACES HAVE YOU LIVED?: 1

## 2023-11-12 SDOH — ECONOMIC STABILITY: FOOD INSECURITY: WITHIN THE PAST 12 MONTHS, THE FOOD YOU BOUGHT JUST DIDN’T LAST AND YOU DIDN’T HAVE MONEY TO GET MORE.: NEVER TRUE

## 2023-11-12 SDOH — ECONOMIC STABILITY: TRANSPORTATION INSECURITY

## 2023-11-12 SDOH — ECONOMIC STABILITY: HOUSING INSECURITY: IN THE PAST 12 MONTHS HAS THE ELECTRIC, GAS, OIL, OR WATER COMPANY THREATENED TO SHUT OFF SERVICES IN YOUR HOME?: NO

## 2023-11-12 SDOH — ECONOMIC STABILITY: TRANSPORTATION INSECURITY
IN THE PAST 12 MONTHS, HAS LACK OF TRANSPORTATION KEPT YOU FROM MEETINGS, WORK, OR FROM GETTING THINGS NEEDED FOR DAILY LIVING?: NO

## 2023-11-12 ASSESSMENT — COGNITIVE AND FUNCTIONAL STATUS - GENERAL
MOBILITY SCORE: 24
DAILY ACTIVITIY SCORE: 24
MOBILITY SCORE: 24
DAILY ACTIVITIY SCORE: 24

## 2023-11-12 ASSESSMENT — ACTIVITIES OF DAILY LIVING (ADL): LACK_OF_TRANSPORTATION: NO

## 2023-11-12 ASSESSMENT — PAIN - FUNCTIONAL ASSESSMENT: PAIN_FUNCTIONAL_ASSESSMENT: 0-10

## 2023-11-12 ASSESSMENT — SOCIAL DETERMINANTS OF HEALTH (SDOH): IN THE PAST 12 MONTHS, HAS THE ELECTRIC, GAS, OIL, OR WATER COMPANY THREATENED TO SHUT OFF SERVICE IN YOUR HOME?: NO

## 2023-11-12 ASSESSMENT — PAIN SCALES - GENERAL: PAINLEVEL_OUTOF10: 5 - MODERATE PAIN

## 2023-11-12 NOTE — CARE PLAN
The patient's goals for the shift include      The clinical goals for the shift include remain comfortable through out the shift

## 2023-11-12 NOTE — PROGRESS NOTES
"Eleno Glaser is a 78 y.o. male on day 0 of admission presenting with Hyperglycemia due to diabetes mellitus (CMS/[facility]).    Subjective   Patient has no complaints.  He is tolerating meals and desires to go home.  His son and daughter in law are still in [place] with his car.     I have reviewed histories, allergies and medications have been reviewed and there are no changes       Objective     Physical Exam  Vitals and nursing note reviewed.   Constitutional:       General: He is not in acute distress.     Appearance: Normal appearance. He is normal weight.   HENT:      Head: Normocephalic and atraumatic.      Nose: Nose normal.      Mouth/Throat:      Mouth: Mucous membranes are moist.   Eyes:      Extraocular Movements: Extraocular movements intact.   Pulmonary:      Effort: Pulmonary effort is normal.   Musculoskeletal:         General: Normal range of motion.   Skin:     General: Skin is warm.   Neurological:      Mental Status: He is alert and oriented to person, place, and time.   Psychiatric:         Mood and Affect: Mood normal.         Last Recorded Vitals  Blood pressure 137/79, pulse 77, temperature 36.6 °C (97.9 °F), temperature source Temporal, resp. rate 16, height 1.727 m (5' 8\"), weight 71 kg (156 lb 8.4 oz), SpO2 96 %.  Intake/Output last 3 Shifts:  I/O last 3 completed shifts:  In: 2105 (29.6 mL/kg) [P.O.:480; I.V.:1625 (22.9 mL/kg)]  Out: 1250 (17.6 mL/kg) [Urine:1250 (0.5 mL/kg/hr)]  Weight: 71 kg     Relevant Results  Results from last 7 days   Lab Units 11/12/23  0714 11/11/23  1953 11/11/23  1549 11/11/23  1146 11/11/23  0632 11/11/23  0258 11/11/23  0136 11/10/23  2319 11/10/23  2316 11/10/23  1334   POCT GLUCOSE mg/dL 221* 344* 291* 254*  --  258*   < >  --    < >  --    GLUCOSE mg/dL  --   --   --   --  130*  --   --  700*  --  690*    < > = values in this interval not displayed.        Scheduled medications  aspirin, 81 mg, oral, Daily  atorvastatin, 40 mg, oral, Daily  enoxaparin, 40 mg, " subcutaneous, q24h  ferrous sulfate (325 mg ferrous sulfate), 1 tablet, oral, Daily  fluticasone, 2 spray, Each Nostril, Daily  insulin glargine, 15 Units, subcutaneous, Nightly  insulin lispro, 0-10 Units, subcutaneous, TID with meals  insulin lispro, 5 Units, subcutaneous, TID with meals  lisinopril, 20 mg, oral, Daily  metoprolol succinate XL, 25 mg, oral, Daily  oxybutynin, 5 mg, oral, BID  pantoprazole, 20 mg, oral, Daily before breakfast  pioglitazone, 45 mg, oral, Daily  [Held by provider] semaglutide, 7 mg, oral, Daily  tamsulosin, 0.4 mg, oral, Daily      Continuous medications  sodium chloride 0.9%, 75 mL/hr, Last Rate: 75 mL/hr (11/12/23 0941)      PRN medications  PRN medications: acetaminophen, dextrose 10 % in water (D10W), dextrose, glucagon, hydrOXYzine HCL, ibuprofen, ondansetron, polyethylene glycol    Results for orders placed or performed during the hospital encounter of 11/10/23 (from the past 24 hour(s))   POCT GLUCOSE   Result Value Ref Range    POCT Glucose 254 (H) 74 - 99 mg/dL   Urinalysis with Reflex Microscopic   Result Value Ref Range    Color, Urine Yellow Straw, Yellow    Appearance, Urine Clear Clear    Specific Gravity, Urine 1.024 1.005 - 1.035    pH, Urine 5.0 5.0, 5.5, 6.0, 6.5, 7.0, 7.5, 8.0    Protein, Urine NEGATIVE NEGATIVE mg/dL    Glucose, Urine >=500 (3+) (A) NEGATIVE mg/dL    Blood, Urine NEGATIVE NEGATIVE    Ketones, Urine 5 (TRACE) (A) NEGATIVE mg/dL    Bilirubin, Urine NEGATIVE NEGATIVE    Urobilinogen, Urine <2.0 <2.0 mg/dL    Nitrite, Urine NEGATIVE NEGATIVE    Leukocyte Esterase, Urine NEGATIVE NEGATIVE   POCT GLUCOSE   Result Value Ref Range    POCT Glucose 291 (H) 74 - 99 mg/dL   POCT GLUCOSE   Result Value Ref Range    POCT Glucose 344 (H) 74 - 99 mg/dL   POCT GLUCOSE   Result Value Ref Range    POCT Glucose 221 (H) 74 - 99 mg/dL     Assessment/Plan   Principal Problem:    Hyperglycemia due to diabetes mellitus (CMS/[facility])    78-year-old male admitted on the account  of profound hyperglycemia.  There was no evidence of diabetic ketoacidosis on biochemical data.  She he was previously on insulin therapy 7 years ago but this was discontinued and exchanged for a GLP-1 agonist.  His hemoglobin A1c was found to be 17.6%.     Recommendations:  To increase Lantus to 20 units subcutaneous bedtime  To increase Humalog to 8 units TID AC   To continue insulin correction scale TID AC   To continue Pioglitazone 45mg once daily   May resume Rybelsus 3mg once daily upon discharge   Diabetic diet as tolerated   Accu-Cheks [facility]    Hypoglycemic protocol   No opposition to discharge  Will continue to follow     Tammy Guan MD

## 2023-11-12 NOTE — PROGRESS NOTES
Subjective   Patient is sitting on the bed, denies any abdominal pain nausea or vomitings pain  Blood sugars improved.  On and off complaining of right lower molar toothache.  No fever chills or rigors  No other significant overnight issues.    Objective     Intake/Output last 3 shifts:  I/O last 3 completed shifts:  In: 2105 (29.6 mL/kg) [P.O.:480; I.V.:1625 (22.9 mL/kg)]  Out: 1250 (17.6 mL/kg) [Urine:1250 (0.5 mL/kg/hr)]  Weight: 71 kg     Intake/Output this shift:  I/O this shift:  In: 1040 [P.O.:240; I.V.:800]  Out: 300 [Urine:300]    Recent Results (from the past 48 hour(s))   CBC    Collection Time: 11/10/23  1:34 PM   Result Value Ref Range    WBC 5.2 4.4 - 11.3 x10*3/uL    nRBC 0.0 0.0 - 0.0 /100 WBCs    RBC 4.75 4.50 - 5.90 x10*6/uL    Hemoglobin 14.1 13.5 - 17.5 g/dL    Hematocrit 43.6 41.0 - 52.0 %    MCV 92 80 - 100 fL    MCH 29.7 26.0 - 34.0 pg    MCHC 32.3 32.0 - 36.0 g/dL    RDW 13.8 11.5 - 14.5 %    Platelets 295 150 - 450 x10*3/uL   Comprehensive Metabolic Panel    Collection Time: 11/10/23  1:34 PM   Result Value Ref Range    Glucose 690 (HH) 74 - 99 mg/dL    Sodium 128 (L) 136 - 145 mmol/L    Potassium 4.5 3.5 - 5.3 mmol/L    Chloride 90 (L) 98 - 107 mmol/L    Bicarbonate 23 21 - 32 mmol/L    Anion Gap 20 10 - 20 mmol/L    Urea Nitrogen 17 6 - 23 mg/dL    Creatinine 1.23 0.50 - 1.30 mg/dL    eGFR 60 (L) >60 mL/min/1.73m*2    Calcium 9.4 8.6 - 10.3 mg/dL    Albumin 4.1 3.4 - 5.0 g/dL    Alkaline Phosphatase 68 33 - 136 U/L    Total Protein 6.2 (L) 6.4 - 8.2 g/dL    AST 15 9 - 39 U/L    Bilirubin, Total 0.6 0.0 - 1.2 mg/dL    ALT 18 10 - 52 U/L   Lipid Panel    Collection Time: 11/10/23  1:34 PM   Result Value Ref Range    Cholesterol 175 0 - 199 mg/dL    HDL-Cholesterol 54.8 mg/dL    Cholesterol/HDL Ratio 3.2     LDL Calculated 82 <=99 mg/dL    VLDL 38 0 - 40 mg/dL    Triglycerides 192 (H) 0 - 149 mg/dL    Non HDL Cholesterol 120 0 - 149 mg/dL   Hemoglobin A1C    Collection Time: 11/10/23  1:34 PM    Result Value Ref Range    Hemoglobin A1C 17.6 (H) see below %    Estimated Average Glucose 458 Not Established mg/dL   POCT GLUCOSE    Collection Time: 11/10/23 11:16 PM   Result Value Ref Range    POCT Glucose >600 (H) 74 - 99 mg/dL   CBC and Auto Differential    Collection Time: 11/10/23 11:19 PM   Result Value Ref Range    WBC 4.6 4.4 - 11.3 x10*3/uL    nRBC 0.0 0.0 - 0.0 /100 WBCs    RBC 4.60 4.50 - 5.90 x10*6/uL    Hemoglobin 13.5 13.5 - 17.5 g/dL    Hematocrit 40.6 (L) 41.0 - 52.0 %    MCV 88 80 - 100 fL    MCH 29.3 26.0 - 34.0 pg    MCHC 33.3 32.0 - 36.0 g/dL    RDW 13.6 11.5 - 14.5 %    Platelets 266 150 - 450 x10*3/uL    Neutrophils % 67.1 40.0 - 80.0 %    Immature Granulocytes %, Automated 1.5 (H) 0.0 - 0.9 %    Lymphocytes % 15.2 13.0 - 44.0 %    Monocytes % 10.3 2.0 - 10.0 %    Eosinophils % 4.8 0.0 - 6.0 %    Basophils % 1.1 0.0 - 2.0 %    Neutrophils Absolute 3.05 1.60 - 5.50 x10*3/uL    Immature Granulocytes Absolute, Automated 0.07 0.00 - 0.50 x10*3/uL    Lymphocytes Absolute 0.69 (L) 0.80 - 3.00 x10*3/uL    Monocytes Absolute 0.47 0.05 - 0.80 x10*3/uL    Eosinophils Absolute 0.22 0.00 - 0.40 x10*3/uL    Basophils Absolute 0.05 0.00 - 0.10 x10*3/uL   Comprehensive metabolic panel    Collection Time: 11/10/23 11:19 PM   Result Value Ref Range    Glucose 700 (HH) 74 - 99 mg/dL    Sodium 125 (L) 136 - 145 mmol/L    Potassium 4.4 3.5 - 5.3 mmol/L    Chloride 90 (L) 98 - 107 mmol/L    Bicarbonate 22 21 - 32 mmol/L    Anion Gap 17 mmol/L    Urea Nitrogen 16 6 - 23 mg/dL    Creatinine 1.27 0.50 - 1.30 mg/dL    eGFR 58 (L) >60 mL/min/1.73m*2    Calcium 9.5 8.6 - 10.3 mg/dL    Albumin 4.0 3.4 - 5.0 g/dL    Alkaline Phosphatase 64 33 - 136 U/L    Total Protein 6.6 6.4 - 8.2 g/dL    AST 13 9 - 39 U/L    Bilirubin, Total 0.5 0.0 - 1.2 mg/dL    ALT 17 10 - 52 U/L   Magnesium    Collection Time: 11/10/23 11:19 PM   Result Value Ref Range    Magnesium 1.64 1.60 - 2.40 mg/dL   Blood Gas Venous Full Panel     Collection Time: 11/10/23 11:19 PM   Result Value Ref Range    POCT pH, Venous 7.47 (H) 7.33 - 7.43 pH    POCT pCO2, Venous 37 (L) 41 - 51 mm Hg    POCT pO2, Venous 54 (H) 35 - 45 mm Hg    POCT SO2, Venous 89 (H) 45 - 75 %    POCT Oxy Hemoglobin, Venous 86.0 (H) 45.0 - 75.0 %    POCT Hematocrit Calculated, Venous 39.0 (L) 41.0 - 52.0 %    POCT Sodium, Venous 125 (L) 136 - 145 mmol/L    POCT Potassium, Venous 4.5 3.5 - 5.3 mmol/L    POCT Chloride, Venous 92 (L) 98 - 107 mmol/L    POCT Ionized Calicum, Venous 1.25 1.10 - 1.33 mmol/L    POCT Glucose, Venous >685 (HH) 74 - 99 mg/dL    POCT Lactate, Venous 1.3 0.4 - 2.0 mmol/L    POCT Base Excess, Venous 3.2 (H) -2.0 - 3.0 mmol/L    POCT HCO3 Calculated, Venous 26.9 (H) 22.0 - 26.0 mmol/L    POCT Hemoglobin, Venous 13.0 (L) 13.5 - 17.5 g/dL    POCT Anion Gap, Venous 11.0 10.0 - 25.0 mmol/L    Patient Temperature      FiO2 0 %   POCT GLUCOSE    Collection Time: 11/11/23  1:36 AM   Result Value Ref Range    POCT Glucose 474 (H) 74 - 99 mg/dL   POCT GLUCOSE    Collection Time: 11/11/23  2:58 AM   Result Value Ref Range    POCT Glucose 258 (H) 74 - 99 mg/dL   Basic metabolic panel    Collection Time: 11/11/23  6:32 AM   Result Value Ref Range    Glucose 130 (H) 74 - 99 mg/dL    Sodium 137 136 - 145 mmol/L    Potassium 3.4 (L) 3.5 - 5.3 mmol/L    Chloride 102 98 - 107 mmol/L    Bicarbonate 26 21 - 32 mmol/L    Anion Gap 12 10 - 20 mmol/L    Urea Nitrogen 13 6 - 23 mg/dL    Creatinine 0.99 0.50 - 1.30 mg/dL    eGFR 78 >60 mL/min/1.73m*2    Calcium 9.1 8.6 - 10.3 mg/dL   POCT GLUCOSE    Collection Time: 11/11/23 11:46 AM   Result Value Ref Range    POCT Glucose 254 (H) 74 - 99 mg/dL   Urinalysis with Reflex Microscopic    Collection Time: 11/11/23  3:02 PM   Result Value Ref Range    Color, Urine Yellow Straw, Yellow    Appearance, Urine Clear Clear    Specific Gravity, Urine 1.024 1.005 - 1.035    pH, Urine 5.0 5.0, 5.5, 6.0, 6.5, 7.0, 7.5, 8.0    Protein, Urine NEGATIVE  NEGATIVE mg/dL    Glucose, Urine >=500 (3+) (A) NEGATIVE mg/dL    Blood, Urine NEGATIVE NEGATIVE    Ketones, Urine 5 (TRACE) (A) NEGATIVE mg/dL    Bilirubin, Urine NEGATIVE NEGATIVE    Urobilinogen, Urine <2.0 <2.0 mg/dL    Nitrite, Urine NEGATIVE NEGATIVE    Leukocyte Esterase, Urine NEGATIVE NEGATIVE   POCT GLUCOSE    Collection Time: 11/11/23  3:49 PM   Result Value Ref Range    POCT Glucose 291 (H) 74 - 99 mg/dL   POCT GLUCOSE    Collection Time: 11/11/23  7:53 PM   Result Value Ref Range    POCT Glucose 344 (H) 74 - 99 mg/dL   POCT GLUCOSE    Collection Time: 11/12/23  7:14 AM   Result Value Ref Range    POCT Glucose 221 (H) 74 - 99 mg/dL   POCT GLUCOSE    Collection Time: 11/12/23 10:52 AM   Result Value Ref Range    POCT Glucose 302 (H) 74 - 99 mg/dL        Electrocardiogram 12 Lead  Normal sinus rhythm  Normal ECG  When compared with ECG of 15-OREN-1999 07:00,  No significant change was found  Confirmed by BROOKS LIZARRAGA MD ([id number]) on 2/26/2014 5:12:25 PM  Electrocardiogram 12 Lead  Normal sinus rhythm  Normal ECG  When compared with ECG of 26-FEB-2014 10:24,  No significant change was found  Confirmed by UMANG TREVIZO MD [id number]) on 10/30/2015 2:44:17 AM  Electrocardiogram 12 Lead  Sinus tachycardia  Left axis deviation  Nonspecific ST abnormality  Abnormal ECG  When compared with ECG of 19-OCT-2015 12:12,  ST now depressed in Inferior leads  Nonspecific T wave abnormality no longer evident in Inferior leads  Confirmed by JAIMIE ROBINS MD [id number]) on 3/11/2021 8:38:55 PM  Electrocardiogram 12 Lead  Normal sinus rhythm  Left axis deviation  Abnormal ECG  When compared with ECG of 19-OCT-2015 12:12,  Nonspecific T wave abnormality no longer evident in Inferior leads  Confirmed by JAIMIE ROBINS MD [id number]) on 3/11/2021 8:38:56 PM  Electrocardiogram 12 Lead  Normal sinus rhythm  Normal ECG  When compared with ECG of 09-FEB-2021 01:03,  MANUAL COMPARISON REQUIRED, DATA IS UNCONFIRMED  Confirmed by  JAIMIE ROBINS MD [id number]) on 3/11/2021 8:38:56 PM       Vital signs in last 24 hours:  Temp:  [36.2 °C (97.2 °F)-36.7 °C (98.1 °F)] 36.6 °C (97.9 °F)  Heart Rate:  [76-87] 77  Resp:  [16-18] 16  BP: (107-146)/(64-79) 137/79    Physical Exam  General: No distress  Neck: Supple.  HEENT: Normocephalic atraumatic pupils equal react light  Heart: S1-S2 heard, no murmurs.  Lungs: Clear to auscultation bilaterally no wheezing.     Assessment/Plan   Hypoglycemic hyperosmolar nonketotic state.  Insulin-dependent diabetes mellitus.  Dental caries.  Hypokalemia.  Pseudohyponatremia.    History of:  Hypertension.  Hyperlipidemia.  Insulin-dependent diabetes mellitus.  Depression.  BPH.  Iron deficiency anemia-continue oral supplements.    Plan:  At the time of admission blood sugars were in 700s.  No signs of diabetic ketoacidosis.  With the subcutaneous insulin-blood sugars improved.  Endocrinology evaluated the patient, recommended to continue Lantus 20 units nightly, Humalog 8 units 3 times daily with meals, along with the sliding scale.  Also recommended to continue pioglitazone 45 mg daily.  Diabetic diet.  Continue hypoglycemic protocol.  Last hemoglobin A1c 17.6-patient need to follow-up with endocrinology as an outpatient.  Continue IV fluids-as patient still could dehydrated.    Dental caries-patient complaining of her right lower molar toothache, continue symptomatic management.  Patient is scheduled with a dentist on November 16 for tooth extraction.    Hypokalemia-replaced, recheck with a.m. labs.    Pseudohyponatremia-resolved.    DVT prophylaxis:  Lovenox daily.    Disposition:  Possible discharge home in next 1 to 2 days.         LOS: 0 days

## 2023-11-12 NOTE — CARE PLAN
The patient's goals for the shift include      The clinical goals for the shift include maintain blood sugar levels

## 2023-11-13 ENCOUNTER — DOCUMENTATION (OUTPATIENT)
Dept: NUTRITION | Facility: HOSPITAL | Age: 78
End: 2023-11-13
Payer: MEDICARE

## 2023-11-13 VITALS
DIASTOLIC BLOOD PRESSURE: 74 MMHG | WEIGHT: 156.53 LBS | RESPIRATION RATE: 18 BRPM | HEART RATE: 82 BPM | TEMPERATURE: 99.1 F | BODY MASS INDEX: 23.72 KG/M2 | HEIGHT: 68 IN | SYSTOLIC BLOOD PRESSURE: 129 MMHG | OXYGEN SATURATION: 95 %

## 2023-11-13 LAB
ANION GAP SERPL CALC-SCNC: 12 MMOL/L (ref 10–20)
BUN SERPL-MCNC: 9 MG/DL (ref 6–23)
CALCIUM SERPL-MCNC: 8.7 MG/DL (ref 8.6–10.3)
CHLORIDE SERPL-SCNC: 106 MMOL/L (ref 98–107)
CO2 SERPL-SCNC: 24 MMOL/L (ref 21–32)
CREAT SERPL-MCNC: 0.93 MG/DL (ref 0.5–1.3)
ERYTHROCYTE [DISTWIDTH] IN BLOOD BY AUTOMATED COUNT: 14.2 % (ref 11.5–14.5)
GFR SERPL CREATININE-BSD FRML MDRD: 85 ML/MIN/1.73M*2
GLUCOSE BLD MANUAL STRIP-MCNC: 106 MG/DL (ref 74–99)
GLUCOSE BLD MANUAL STRIP-MCNC: 176 MG/DL (ref 74–99)
GLUCOSE SERPL-MCNC: 104 MG/DL (ref 74–99)
HCT VFR BLD AUTO: 38.6 % (ref 41–52)
HGB BLD-MCNC: 12.4 G/DL (ref 13.5–17.5)
MCH RBC QN AUTO: 29.3 PG (ref 26–34)
MCHC RBC AUTO-ENTMCNC: 32.1 G/DL (ref 32–36)
MCV RBC AUTO: 91 FL (ref 80–100)
NRBC BLD-RTO: 0 /100 WBCS (ref 0–0)
PLATELET # BLD AUTO: 265 X10*3/UL (ref 150–450)
POTASSIUM SERPL-SCNC: 3.6 MMOL/L (ref 3.5–5.3)
RBC # BLD AUTO: 4.23 X10*6/UL (ref 4.5–5.9)
SODIUM SERPL-SCNC: 138 MMOL/L (ref 136–145)
WBC # BLD AUTO: 5.4 X10*3/UL (ref 4.4–11.3)

## 2023-11-13 PROCEDURE — 80048 BASIC METABOLIC PNL TOTAL CA: CPT | Performed by: INTERNAL MEDICINE

## 2023-11-13 PROCEDURE — 82947 ASSAY GLUCOSE BLOOD QUANT: CPT

## 2023-11-13 PROCEDURE — 2500000004 HC RX 250 GENERAL PHARMACY W/ HCPCS (ALT 636 FOR OP/ED): Performed by: INTERNAL MEDICINE

## 2023-11-13 PROCEDURE — 2500000002 HC RX 250 W HCPCS SELF ADMINISTERED DRUGS (ALT 637 FOR MEDICARE OP, ALT 636 FOR OP/ED): Performed by: INTERNAL MEDICINE

## 2023-11-13 PROCEDURE — 99239 HOSP IP/OBS DSCHRG MGMT >30: CPT | Performed by: STUDENT IN AN ORGANIZED HEALTH CARE EDUCATION/TRAINING PROGRAM

## 2023-11-13 PROCEDURE — 36415 COLL VENOUS BLD VENIPUNCTURE: CPT | Performed by: INTERNAL MEDICINE

## 2023-11-13 PROCEDURE — 2500000001 HC RX 250 WO HCPCS SELF ADMINISTERED DRUGS (ALT 637 FOR MEDICARE OP): Performed by: INTERNAL MEDICINE

## 2023-11-13 PROCEDURE — 85027 COMPLETE CBC AUTOMATED: CPT | Performed by: INTERNAL MEDICINE

## 2023-11-13 PROCEDURE — 99232 SBSQ HOSP IP/OBS MODERATE 35: CPT | Performed by: INTERNAL MEDICINE

## 2023-11-13 RX ORDER — INSULIN ASPART INJECTION 100 [IU]/ML
8 INJECTION, SOLUTION SUBCUTANEOUS
Qty: 30 ML | Refills: 0 | Status: SHIPPED | OUTPATIENT
Start: 2023-11-13 | End: 2023-12-28

## 2023-11-13 RX ORDER — INSULIN GLARGINE 100 [IU]/ML
15 INJECTION, SOLUTION SUBCUTANEOUS NIGHTLY
Status: DISCONTINUED | OUTPATIENT
Start: 2023-11-13 | End: 2023-11-13 | Stop reason: HOSPADM

## 2023-11-13 RX ORDER — INSULIN GLARGINE 100 [IU]/ML
15 INJECTION, SOLUTION SUBCUTANEOUS NIGHTLY
Qty: 4.5 ML | Refills: 0 | Status: SHIPPED | OUTPATIENT
Start: 2023-11-13 | End: 2024-01-25 | Stop reason: SDUPTHER

## 2023-11-13 RX ORDER — PEN NEEDLE, DIABETIC 30 GX3/16"
NEEDLE, DISPOSABLE MISCELLANEOUS
Qty: 200 EACH | Refills: 0 | Status: SHIPPED | OUTPATIENT
Start: 2023-11-13

## 2023-11-13 RX ORDER — INSULIN LISPRO 100 [IU]/ML
8 INJECTION, SOLUTION INTRAVENOUS; SUBCUTANEOUS
Qty: 10 ML | Refills: 0 | Status: SHIPPED | OUTPATIENT
Start: 2023-11-13 | End: 2023-11-13

## 2023-11-13 RX ADMIN — ATORVASTATIN CALCIUM 40 MG: 40 TABLET, FILM COATED ORAL at 08:36

## 2023-11-13 RX ADMIN — PANTOPRAZOLE SODIUM 20 MG: 20 TABLET, DELAYED RELEASE ORAL at 06:26

## 2023-11-13 RX ADMIN — INSULIN LISPRO 2 UNITS: 100 INJECTION, SOLUTION INTRAVENOUS; SUBCUTANEOUS at 11:53

## 2023-11-13 RX ADMIN — SODIUM CHLORIDE 75 ML/HR: 9 INJECTION, SOLUTION INTRAVENOUS at 13:23

## 2023-11-13 RX ADMIN — OXYBUTYNIN CHLORIDE 5 MG: 5 TABLET ORAL at 08:36

## 2023-11-13 RX ADMIN — INSULIN LISPRO 8 UNITS: 100 INJECTION, SOLUTION INTRAVENOUS; SUBCUTANEOUS at 08:40

## 2023-11-13 RX ADMIN — TAMSULOSIN HYDROCHLORIDE 0.4 MG: 0.4 CAPSULE ORAL at 08:37

## 2023-11-13 RX ADMIN — ASPIRIN 81 MG: 81 TABLET, COATED ORAL at 08:36

## 2023-11-13 RX ADMIN — FLUTICASONE PROPIONATE 2 SPRAY: 50 SPRAY, METERED NASAL at 08:37

## 2023-11-13 RX ADMIN — LISINOPRIL 20 MG: 20 TABLET ORAL at 08:36

## 2023-11-13 RX ADMIN — METOPROLOL SUCCINATE 25 MG: 25 TABLET, FILM COATED, EXTENDED RELEASE ORAL at 08:36

## 2023-11-13 RX ADMIN — PIOGLITAZONE HYDROCHLORIDE 45 MG: 30 TABLET ORAL at 08:36

## 2023-11-13 RX ADMIN — INSULIN LISPRO 8 UNITS: 100 INJECTION, SOLUTION INTRAVENOUS; SUBCUTANEOUS at 11:57

## 2023-11-13 RX ADMIN — FERROUS SULFATE TAB 325 MG (65 MG ELEMENTAL FE) 325 MG: 325 (65 FE) TAB at 08:36

## 2023-11-13 ASSESSMENT — PAIN SCALES - GENERAL: PAINLEVEL_OUTOF10: 2

## 2023-11-13 ASSESSMENT — COGNITIVE AND FUNCTIONAL STATUS - GENERAL
DAILY ACTIVITIY SCORE: 24
MOBILITY SCORE: 24

## 2023-11-13 ASSESSMENT — PAIN - FUNCTIONAL ASSESSMENT: PAIN_FUNCTIONAL_ASSESSMENT: 0-10

## 2023-11-13 NOTE — DISCHARGE SUMMARY
"Discharge Diagnosis  Hyperglycemia due to diabetes mellitus (CMS/[facility])    Issues Requiring Follow-Up  DM    Discharge Meds     Your medication list        START taking these medications        Instructions Last Dose Given Next Dose Due   insulin glargine 100 unit/mL (3 mL) pen  Commonly known as: Lantus      Inject 15 Units under the skin once daily at bedtime.       insulin lispro 100 unit/mL injection  Commonly known as: HumaLOG      Inject 8 Units under the skin 3 times a day with meals.       pen needle, diabetic 31 gauge x 5/16\" needle      To be used 4 times daily with insulin shots              CHANGE how you take these medications        Instructions Last Dose Given Next Dose Due   blood sugar diagnostic strip  What changed: See the new instructions.      1 each 3 times a day before meals.              CONTINUE taking these medications        Instructions Last Dose Given Next Dose Due   aspirin 81 mg EC tablet           atorvastatin 40 mg tablet  Commonly known as: Lipitor      TAKE 1 TABLET ONCE DAILY       esomeprazole 40 mg DR capsule  Commonly known as: NexIUM      TAKE 1 CAPSULE ONCE DAILY  IN THE MORNING BEFORE A    MEAL       ferrous sulfate (325 mg ferrous sulfate) tablet           Flonase Allergy Relief 50 mcg/actuation nasal spray  Generic drug: fluticasone           fosinopril 20 mg tablet  Commonly known as: Monopril      TAKE 1 TABLET ONCE DAILY       hydrOXYzine HCL 25 mg tablet  Commonly known as: Atarax           metoprolol succinate XL 25 mg 24 hr tablet  Commonly known as: Toprol-XL      TAKE 1 TABLET ONCE DAILY       Myrbetriq 50 mg tablet extended release 24 hr 24 hr tablet  Generic drug: mirabegron           Rybelsus 7 mg tablet  Generic drug: semaglutide      Take 1 tablet (7 mg) by mouth once daily.       tamsulosin 0.4 mg 24 hr capsule  Commonly known as: Flomax      TAKE 1 CAPSULE IN THE      MORNING AND 1 CAPSULE AT   BEDTIME              STOP taking these medications  " "    pioglitazone 45 mg tablet  Commonly known as: Actos                  Where to Get Your Medications        These medications were sent to [facility]/pharmacy #[id number] - [place], [place] - [address] [address]      Phone: [phone]   blood sugar diagnostic strip  insulin glargine 100 unit/mL (3 mL) pen  insulin lispro 100 unit/mL injection  pen needle, diabetic 31 gauge x 5/16\" needle         Test Results Pending At Discharge  Pending Labs       No current pending labs.            Hospital Course   Eleon Glaser is a 78 y.o. male with a past medical history of diabetes mellitus type 2, Rojas's esophagus, hypertension, depression, hyperlipidemia, cervical spinal cord compression who was admitted to the hospital for hyperglycemia. Endo was consulted. Pt is started on insulin. BGT improved. He is given diabetic education. He is hemodynamically stable for discharge at this time with close outpatient follow ups.      The patient was discharged in satisfactory condition.   Medications and side effect profile reviewed with patient.  More than 30 minutes were spent in coordinating patient discharge       Pertinent Physical Exam At Time of Discharge  Physical Exam  Vitals and nursing note reviewed.   Constitutional:       General: He is not in acute distress.     Appearance: Normal appearance. He is not ill-appearing or toxic-appearing.   HENT:      Head: Normocephalic and atraumatic.      Nose: Nose normal.      Mouth/Throat:      Mouth: Mucous membranes are moist.   Eyes:      Extraocular Movements: Extraocular movements intact.      Conjunctiva/sclera: Conjunctivae normal.      Pupils: Pupils are equal, round, and reactive to light.   Cardiovascular:      Rate and Rhythm: Normal rate.      Heart sounds: No murmur heard.     No gallop.   Pulmonary:      Effort: Pulmonary effort is normal. No respiratory distress.      Breath sounds: Normal breath sounds. No wheezing, rhonchi or rales.   Abdominal:      " General: Abdomen is flat. Bowel sounds are normal. There is no distension.      Palpations: Abdomen is soft. There is no mass.      Tenderness: There is no abdominal tenderness.   Musculoskeletal:         General: No swelling or tenderness. Normal range of motion.      Cervical back: Normal range of motion and neck supple.   Skin:     General: Skin is warm and dry.   Neurological:      General: No focal deficit present.      Mental Status: He is alert and oriented to person, place, and time. Mental status is at baseline.   Psychiatric:         Mood and Affect: Mood normal.         Behavior: Behavior normal.         Thought Content: Thought content normal.         Judgment: Judgment normal.         Outpatient Follow-Up  No future appointments.      Yessi Finley MD  Hospitalist

## 2023-11-13 NOTE — PROGRESS NOTES
11/13/23 1318   Discharge Planning   Living Arrangements Children;Other (Comment)  (Son and DIL live with the patient.)   Support Systems Children   Assistance Needed X3, independent in ADLs, drives, ambulates without assistive devices, no O2 at baseline   Type of Residence Private residence   Number of Stairs to Enter Residence 1   Number of Stairs Within Residence 13   Who is requesting discharge planning? Provider   Home or Post Acute Services None   Patient expects to be discharged to: Home with family, no needs- denied need for HHC   Does the patient need discharge transport arranged? No   RoundTrip coordination needed? No   Has discharge transport been arranged? No

## 2023-11-13 NOTE — CARE PLAN
The patient's goals for the shift include  to be discharged home.    The clinical goals for the shift include remain comfortable through out the shift    Over the shift, the patient is free is neuro deficits. Pt is compliant with medications

## 2023-11-13 NOTE — NURSING NOTE
Discharge instructions reviewed with patient. No questions at this time. Education given for new homegoing medications with type, uses, and most common side effects. Patient verbalized understanding. DM education given by diabetic educator.  Discharged home in stable condition.

## 2023-11-13 NOTE — CONSULTS
"Eleno Glaser is a 78 y.o. male with a past medical history of diabetes mellitus type 2, Rojas's esophagus, hypertension, depression, hyperlipidemia, cervical spinal cord compression who was admitted to the hospital for hyperglycemia     Patient A1c when admitted A1c 17.6% with Eag of 458. Patient denies s/s of hyperglycemia. He presented with a \"tooth ache\" and is planning on having an extraction on 11/16/2023. Patient states he feels well and was surprised that blood sugar this high. In reviewing past A1c,in June 2023 A1c 16.5%. Patient does not take his blood sugar on a regular basis.  He was diagnosed with Type 2 diabetes in the mid 80's. He verbalizes that he recently changed medications. He was taking Actos 45 mg daily,and Trulicity. He was to start Rybelsus, yet was not sure if insurance would this medication.He has high blood pressure and Hyperlipidemia. He states that he has neuropathy in both feet. He does not have annual foot exams with podiatry.  Patient lives with his son and daughter in law. He admits to not following a diet appropriate for an individual with diabetes. He does consume sugary cereal and likes  canned products as they are easy and convenient. He does not cook.  He does eat one good meal a day which his daughter in law prepares. He does not count carbs. He would benefit greatly from outpatient education with a dietician or diabetes educator.   Nurse educated on importance of taking daily blood sugar and impact on health.. Nurse recommends a cgm as it will educate him on his blood sugar in general. He may never had the education in the past to learn tools to healthy living. Since he doesn't display s/s of hyperglycemia it is not uncommon to continue living without restrictions on diet or change in lifestyle patterns.  Nurse placed a Freestyle [place]a 14 day trial cgm on patient. He will be discharged on:        insulin glargine 100 unit/mL (3 mL) pen  Commonly known as: Lantus       " Inject 15 Units under the skin once daily at bedtime.        insulin lispro 100 unit/mL injection  Commonly known as: HumaLOG       Inject 8 Units under the skin 3 times a day with meals.        Patient familiar with insulin administration as he has been on insulin in the past. Nurse will place order for CGM to continue monitoring blood sugars. Nurse will follow up with patient post discharge. Patient open to additional education. Materials left at bedside as well as office number to call with questions regarding cgm or other diabetes related questions.

## 2023-11-13 NOTE — PROGRESS NOTES
"Eleno Glaser is a 78 y.o. male on day 1 of admission presenting with Hyperglycemia due to diabetes mellitus (CMS/[facility]).    Subjective   No new complaints  Patient confirms he has not used insulin    Chronic pioglitazone  Recent addition of Rybelsus     Scheduled medications  aspirin, 81 mg, oral, Daily  atorvastatin, 40 mg, oral, Daily  enoxaparin, 40 mg, subcutaneous, q24h  ferrous sulfate (325 mg ferrous sulfate), 1 tablet, oral, Daily  fluticasone, 2 spray, Each Nostril, Daily  insulin glargine, 20 Units, subcutaneous, Nightly  insulin lispro, 0-10 Units, subcutaneous, TID with meals  insulin lispro, 8 Units, subcutaneous, TID with meals  lisinopril, 20 mg, oral, Daily  metoprolol succinate XL, 25 mg, oral, Daily  oxybutynin, 5 mg, oral, BID  pantoprazole, 20 mg, oral, Daily before breakfast  pioglitazone, 45 mg, oral, Daily  [Held by provider] semaglutide, 7 mg, oral, Daily  tamsulosin, 0.4 mg, oral, Daily      Continuous medications  sodium chloride 0.9%, 75 mL/hr, Last Rate: 75 mL/hr (11/12/23 0547)      PRN medications  PRN medications: acetaminophen, dextrose 10 % in water (D10W), dextrose, glucagon, hydrOXYzine HCL, ibuprofen, ondansetron, polyethylene glycol    Objective   Physical Exam  Constitutional:       General: He is not in acute distress.     Comments: Hard of hearing   HENT:      Head: Normocephalic.   Eyes:      Extraocular Movements: Extraocular movements intact.   Cardiovascular:      Pulses:           Radial pulses are 2+ on the right side and 2+ on the left side.   Neurological:      Mental Status: He is alert.      Motor: No tremor.   Psychiatric:         Mood and Affect: Affect normal.         Last Recorded Vitals  Blood pressure 137/74, pulse 73, temperature 36.4 °C (97.5 °F), resp. rate 16, height 1.727 m (5' 8\"), weight 71 kg (156 lb 8.4 oz), SpO2 96 %.  Intake/Output last 3 Shifts:  I/O last 3 completed shifts:  In: 4655 (65.6 mL/kg) [P.O.:480; I.V.:4175 (58.8 mL/kg)]  Out: 3070 (43.2 " mL/kg) [Urine:3070 (1.2 mL/kg/hr)]  Weight: 71 kg     Relevant Results  Results from last 7 days   Lab Units 11/13/23  0726 11/13/23  0630 11/12/23  2019 11/12/23  1604 11/12/23  1052 11/12/23  0714 11/11/23  1146 11/11/23  0632 11/11/23  0136 11/10/23  2319 11/10/23  2316 11/10/23  1334   POCT GLUCOSE mg/dL 106*  --  98 218* 302* 221*   < >  --    < >  --    < >  --    GLUCOSE mg/dL  --  104*  --   --   --   --   --  130*  --  700*  --  690*    < > = values in this interval not displayed.         Assessment/Plan   Principal Problem:    Hyperglycemia due to diabetes mellitus (CMS/[facility])  Active Problems:    Hyperglycemia    TYPE 2 DIABETES MELLITUS WITH HYPERGLYCEMIA  Presented with very high A1c  Glucose much improved with insulin    RECOMMENDATIONS  Decrease Glargine to 15 units at bedtime  Lispro 8 units QAC plus scale  Discontinue pioglitazone due to lack of effect, and risk of fluid retention with pioglitazone and insulin  Diabetes education    Jaden Mcwilliams MD

## 2023-11-14 ENCOUNTER — PATIENT OUTREACH (OUTPATIENT)
Dept: CARE COORDINATION | Facility: CLINIC | Age: 78
End: 2023-11-14
Payer: MEDICARE

## 2023-11-14 NOTE — PROGRESS NOTES
Discharge Facility: [facility]  Discharge Diagnosis: hyperglycemia due to diabetes mellitus  Admission Date: 11/11/23  Discharge Date: 11/13/23    PCP Appointment Date: 11/17/23  Specialist Appointment Date: dental appt 11/16/23  Hospital Encounter and Summary: Linked  See discharge assessment below for further details    Engagement  Call Start Time: 1009 (11/14/2023 10:09 AM)    Medications  Medications reviewed with patient/caregiver?: Yes (11/14/2023 10:09 AM)  Is the patient having any side effects they believe may be caused by any medication additions or changes?: No (11/14/2023 10:09 AM)  Does the patient have all medications ordered at discharge?: Yes (11/14/2023 10:09 AM)  Medication Comments: new/changed medications reviewed only. insulin glargine 100 unit/mL (3 mL) pen  Commonly known as: Lantus       Inject 15 Units under the skin once daily at bedtime.         insulin lispro 100 unit/mL injection  Commonly known as: HumaLOG       Inject 8 Units under the skin 3 times a day with meals. (11/14/2023 10:09 AM)    Appointments  Does the patient have a primary care provider?: Yes (11/14/2023 10:09 AM)  Care Management Interventions: Verified appointment date/time/provider (11/14/2023 10:09 AM)  Care Management Interventions: Advised patient to keep appointment (11/14/2023 10:09 AM)    Self Management  What is the home health agency?: n/a (11/14/2023 10:09 AM)  What Durable Medical Equipment (DME) was ordered?: CGM trial (11/14/2023 10:09 AM)    Patient Teaching  Care Management Interventions: Reviewed instructions with patient (11/14/2023 10:09 AM)  What is the patient's perception of their health status since discharge?: Same (11/14/2023 10:09 AM)  Is the patient/caregiver able to teach back the hierarchy of who to call/visit for symptoms/problems? PCP, Specialist, Home Health nurse, Urgent Care, ED, 911: Yes (11/14/2023 10:09 AM)  Patient/Caregiver Education Comments: Diabetes and insulin reviewed (11/14/2023  10:09 AM)    Wrap Up  Wrap Up Additional Comments: Patient with continued complaints of toothache, sees dentist 11/16 for extraction. (11/14/2023 10:09 AM)

## 2023-11-15 NOTE — SIGNIFICANT EVENT
Follow Up Phone Call    Outgoing phone call    Spoke to: Eleno Glaser Relationship:self   Phone number: [phone]      Outcome: I left a message on answering machine   Chief Complaint   Patient presents with    Labs Only     BGL OFF reported high on meter. Told by PCP to to get checked.          Diagnosis:Not applicable

## 2023-11-15 NOTE — SIGNIFICANT EVENT
Follow Up Phone Call    Incoming phone call    Spoke to: Eleno Glaser Relationship:self   Phone number: [phone]      Outcome: contacted patient/ family   Chief Complaint   Patient presents with    Labs Only     BGL OFF reported high on meter. Told by PCP to to get checked.          Diagnosis:Not applicable  States he is feeling better. Has appointment to have tooth pulled tomorrow. 11/17/23 He has appointment to discuss blood sugars and insulin. No further questions or concerns.

## 2023-11-17 ENCOUNTER — APPOINTMENT (OUTPATIENT)
Dept: PRIMARY CARE | Facility: CLINIC | Age: 78
End: 2023-11-17
Payer: MEDICARE

## 2023-11-20 ENCOUNTER — APPOINTMENT (OUTPATIENT)
Dept: PRIMARY CARE | Facility: CLINIC | Age: 78
End: 2023-11-20
Payer: MEDICARE

## 2023-11-21 DIAGNOSIS — E11.9 CONTROLLED TYPE 2 DIABETES MELLITUS WITHOUT COMPLICATION, UNSPECIFIED WHETHER LONG TERM INSULIN USE (MULTI): Primary | ICD-10-CM

## 2023-11-22 ENCOUNTER — PATIENT OUTREACH (OUTPATIENT)
Dept: CARE COORDINATION | Facility: CLINIC | Age: 78
End: 2023-11-22
Payer: MEDICARE

## 2023-11-22 NOTE — PROGRESS NOTES
Unable to reach patient for call back after patient's hospitalization. Patient did not make PCP appt within 14 days of discharge.  LVM with call back number for patient to call if needed   If no voicemail available call attempts x 2 were made to contact the patient to assist with any questions or concerns patient may have.

## 2023-12-09 DIAGNOSIS — K21.9 GASTROESOPHAGEAL REFLUX DISEASE, UNSPECIFIED WHETHER ESOPHAGITIS PRESENT: ICD-10-CM

## 2023-12-11 ENCOUNTER — PATIENT OUTREACH (OUTPATIENT)
Dept: PRIMARY CARE | Facility: CLINIC | Age: 78
End: 2023-12-11
Payer: MEDICARE

## 2023-12-11 RX ORDER — ESOMEPRAZOLE MAGNESIUM 40 MG/1
CAPSULE, DELAYED RELEASE ORAL
Qty: 90 CAPSULE | Refills: 1 | Status: SHIPPED | OUTPATIENT
Start: 2023-12-11 | End: 2024-03-07 | Stop reason: SDUPTHER

## 2023-12-12 ENCOUNTER — TELEMEDICINE (OUTPATIENT)
Dept: PHARMACY | Facility: HOSPITAL | Age: 78
End: 2023-12-12
Payer: MEDICARE

## 2023-12-12 DIAGNOSIS — E11.9 CONTROLLED TYPE 2 DIABETES MELLITUS WITHOUT COMPLICATION, UNSPECIFIED WHETHER LONG TERM INSULIN USE (MULTI): Primary | ICD-10-CM

## 2023-12-12 RX ORDER — PERPHENAZINE 16 MG
1 TABLET ORAL DAILY
COMMUNITY

## 2023-12-12 RX ORDER — BLOOD-GLUCOSE SENSOR
EACH MISCELLANEOUS
Qty: 9 EACH | Refills: 0 | Status: SHIPPED | OUTPATIENT
Start: 2023-12-12

## 2023-12-12 RX ORDER — IBUPROFEN 800 MG/1
800 TABLET ORAL EVERY 8 HOURS PRN
COMMUNITY

## 2023-12-12 ASSESSMENT — ENCOUNTER SYMPTOMS: DIZZINESS: 1

## 2023-12-12 NOTE — ASSESSMENT & PLAN NOTE
Eleno Glaser has poorly controlled type 2 diabetes that is improving complicated by hypertension and hyperlipidemia as evidenced by his most recent A1c of 17.6% on 11/10/23 which had increased further from 16.5% on 06/16/23 however patient reports his blood sugars at home have come down to now all being <200 mg/dL however patient did not have log on him today. He does not follow with endocrinology.   Patient is currently in the coverage gap which led to difficulty affording Rybelsus. Patient reports that he makes too much money at this time for our  Patient Assistance Program.   He used a Isabela 3 sensor for the first 2 weeks after leaving the hospital and reported that he liked using it aside from the fact it wasn't always accurate to his blood glucose levels checked with his glucometer. We discussed that Dexcom is slightly more accurate and his insurance should cover it given he is on insulin. Prescriptions were sent to [employer] for the Dexcom G7 sensors and I will assist in getting the company the information they need to get the coverage approved.   Right now patient's blood glucose readings are much improved and I would like to see CGM reports in order to be able to adjust his insulin more accurately, especially since patient did not have his log with him today.   CONTINUE:   Lantus 100 unit/mL 15 units under the skin once daily   Fiasp 100 unit/mL 8 units under the skin 3 times a day with meals  Patient was encouraged to hold a dose of this insulin if his blood sugar is on the lower end (< mg/dL)  START: Dexcom G7 sensors apply 1 sensor to the back of the arm to monitor blood sugar continuously, remove and apply new sensor every 10 days  Patient also requested refills for his test strips which were sent to [facility] pharmacy.   Patient also is due for an appointment with his PCP Vera Ferrell. A message was sent with this encounter to her making her aware of this, as patient reports  that the office had to cancel his most recent scheduled appointment in November.   We will assess diet and exercise habits at next pharmacy encounter.   I will reach out to patient once I know about his Dexcom supplies and at that point we will determine the follow-up plan.

## 2023-12-12 NOTE — PROGRESS NOTES
Pharmacy Post-Discharge Visit  Eleno Glaser is a 78 y.o. male was referred to Clinical Pharmacy Team to complete a post-discharge medication optimization and monitoring visit.  The patient was referred for their Diabetes.    Admission Date: 11/10/23  Discharge Date: 11/13/23  Discharge Diagnosis: Hyperglycemia due to diabetes mellitus      Referring Provider: MASON Guerrero-CNP    Subjective   No Known Allergies    [id number] - [place], [place] - [address] [address]  Phone: [phone] Fax: [phone]    [facility] - [place] - [facility]  [place] [address]  Phone: [phone] Fax: [phone]    [place] [facility] [address] [facility]  [address] [facility] [address]  Phone: [phone] Fax: [phone]      Social History     Social History Narrative    Not on file      Notable Medication changes following discharge:  Start:   Insulin glargine 100 unit/mL 15 units at bedtime  Insulin lispro 100 unit/mL 8 units 3 times a day with meals  Stop:   Pioglitazone 45 mg     Diabetes  He presents for his initial diabetic visit. He has type 2 diabetes mellitus. Hypoglycemia symptoms include dizziness. Associated symptoms include polyuria (notes improvement since discharge). There are no hypoglycemic complications. Symptoms are improving. There are no diabetic complications. Risk factors for coronary artery disease include hypertension, male sex and dyslipidemia. Current diabetic treatment includes insulin injections. He is compliant with treatment most of the time. He is currently taking insulin pre-breakfast, pre-lunch, pre-dinner and at bedtime. Insulin injections are given by patient. (All <200 mg/dL)     He saw endocrinology while inpatient but it does not look like he follows with endocrinology outpatient.     Has held insulin if his  blood glucose was <100 mg/dL before meals     Patient does have a family history of diabetes (mother, father, grandmother)    Patient is using: glucometer  The patient is currently checking the blood glucose 3 times per day.  Reports all readings have been <200 mg/dL for a couple weeks since discharge   Was using a Isabela 3 for 2 weeks after discharge that they gave him at the hospital but ran out of it     Hypoglycemia frequency: occasionally will drop low, notes he gets symptoms in the 100s   Hypoglycemia awareness: Yes     Diet:   Not assessed this visit, will assess at pharmacy follow-up    Physical Activity   Not assessed this visit, will assess at pharmacy follow-up     Current Diabetes Medications:   Insulin glargine (Lantus) 100 unit/mL 15 units daily at bedtime  Insulin lispro (Fiasp) 100 unit/mL 8 units 3 times a day with meals     Historical Diabetes Medications:  Pioglitazone 45 mg (discontinued due to lack of effect and risk for fluid retention)  Trulicity 3 mg (discontinued due to limited effect)  Metformin 500 mg (experienced dyspepsia)  Rybelsus 7 mg (patient states he was told to stop in the hospital due benefit for the cost he was paying)    Secondary Prevention  Statin? Yes, Atorvastatin 40 mg   ACE-I/ARB? Yes, Fosinopril 20 mg   Aspirin? Yes, 81 mg daily     Pertinent PMH Review:   PMH of Pancreatitis: No  PMH of Retinopathy: No  PMH of Urinary Tract Infections: No  PMH of MTC: No    Health Maintenance:   Foot Exam: Not assessed   Eye Exam: Appointment scheduled for Thursday 12/14/23  Lipid Panel: LDL 82 mg/dL,  mg/dL 11/10/23  Urine Albumin: Unable to calculate due to albumin <7 12/17/21  Influenza Vaccine: 11/08/23  Pneumonia Vaccine:   PCV13: 05/15/15  PPSV23: 12/10/09, 10/21/16    Objective     LAB  Lab Results   Component Value Date    BILITOT 0.5 11/10/2023    CALCIUM 8.7 11/13/2023    CO2 24 11/13/2023     11/13/2023    CREATININE 0.93 11/13/2023    GLUCOSE 104 (H) 11/13/2023     ALKPHOS 64 11/10/2023    K 3.6 11/13/2023    PROT 6.6 11/10/2023     11/13/2023    AST 13 11/10/2023    ALT 17 11/10/2023    BUN 9 11/13/2023    ANIONGAP 12 11/13/2023    MG 1.64 11/10/2023    PHOS 2.9 02/08/2021    ALBUMIN 4.0 11/10/2023    GFRMALE 62 06/16/2023     Lab Results   Component Value Date    TRIG 192 (H) 11/10/2023    CHOL 175 11/10/2023    LDLCALC 82 11/10/2023    HDL 54.8 11/10/2023     Lab Results   Component Value Date    HGBA1C 17.6 (H) 11/10/2023    HGBA1C 16.5 (A) 06/16/2023    HGBA1C 8.9 (A) 11/17/2022       The 10-year ASCVD risk score (Liliam PAZ, et al., 2019) is: 50.4%    Values used to calculate the score:      Age: 78 years      Sex: Male      Is Non- : No      Diabetic: Yes      Tobacco smoker: No      Systolic Blood Pressure: 129 mmHg      Is BP treated: Yes      HDL Cholesterol: 54.8 mg/dL      Total Cholesterol: 175 mg/dL    MEDICATION RECONCILIATION  Added:   Alpha lipoic acid 600 mg once daily   Excedrine Migraine 250-250-65 mg once daily as needed (patient reports taking mostly everyday)  Ibuprofen 800 mg every 8 hours if needed for pain (tooth pain)  Changed:   Fosinopril 20 mg 1/2 tablet once daily (changed from 1 tablet once daily)  Removed:   Rybelsus 7 mg (patient is no longer taking)    Current Outpatient Medications on File Prior to Visit   Medication Sig Dispense Refill    alpha lipoic acid 600 mg capsule Take 1 capsule by mouth once daily.      aspirin-acetaminophen-caffeine (Excedrin Migraine) 250-250-65 mg tablet Take 1 tablet by mouth once daily.      atorvastatin (Lipitor) 40 mg tablet TAKE 1 TABLET ONCE DAILY 90 tablet 1    esomeprazole (NexIUM) 40 mg DR capsule TAKE 1 CAPSULE BY MOUTH EVERY DAY BEFORE BREAKFAST 90 capsule 1    ferrous sulfate 325 (65 Fe) MG tablet Take 1 tablet by mouth once daily.      fluticasone (Flonase Allergy Relief) 50 mcg/actuation nasal spray Administer 2 sprays into affected nostril(s) once daily.      fosinopril  "(Monopril) 20 mg tablet TAKE 1 TABLET ONCE DAILY (Patient taking differently: Take 0.5 tablets (10 mg) by mouth once daily.) 90 tablet 1    hydrOXYzine HCL (Atarax) 25 mg tablet Take 1 tablet (25 mg) by mouth 3 times a day as needed for anxiety.      ibuprofen 800 mg tablet Take 1 tablet (800 mg) by mouth every 8 hours if needed for mild pain (1 - 3).      insulin aspart, with niacinamide, (Fiasp FlexTouch U-100 Insulin) 100 unit/mL (3 mL) injection Inject 8 Units under the skin 3 times a day with meals. Take as directed per insulin instructions. 30 mL 0    insulin glargine (Lantus) 100 unit/mL (3 mL) pen Inject 15 Units under the skin once daily at bedtime. 4.5 mL 0    metoprolol succinate XL (Toprol-XL) 25 mg 24 hr tablet TAKE 1 TABLET ONCE DAILY 30 tablet 0    Myrbetriq 50 mg tablet extended release 24 hr 24 hr tablet Take 1 tablet (50 mg) by mouth once daily.      pen needle, diabetic 31 gauge x 5/16\" needle To be used 4 times daily with insulin shots 200 each 0    tamsulosin (Flomax) 0.4 mg 24 hr capsule TAKE 1 CAPSULE IN THE      MORNING AND 1 CAPSULE AT   BEDTIME 60 capsule 3    [DISCONTINUED] blood sugar diagnostic strip 1 each 3 times a day before meals. 100 strip 0    aspirin 81 mg EC tablet Take 1 tablet (81 mg) by mouth once daily.      [DISCONTINUED] esomeprazole (NexIUM) 40 mg DR capsule TAKE 1 CAPSULE ONCE DAILY  IN THE MORNING BEFORE A    MEAL 90 capsule 1    [DISCONTINUED] semaglutide (Rybelsus) 7 mg tablet Take 1 tablet (7 mg) by mouth once daily. (Patient not taking: Reported on 2023) 30 tablet 0     No current facility-administered medications on file prior to visit.      DRUG INTERACTIONS  There are no significant drug-drug interactions requiring adjustment to patient's medication therapy.     PATIENT GOALS   Fasting B - 130 mg/dL  Postprandial BG: less than 180 mg/dL  A1c: less than 7%     Assessment/Plan   Problem List Items Addressed This Visit       Diabetes mellitus type 2, " controlled, without complications (CMS/[facility]) - Primary     Eleno Glaser has poorly controlled type 2 diabetes that is improving complicated by hypertension and hyperlipidemia as evidenced by his most recent A1c of 17.6% on 11/10/23 which had increased further from 16.5% on 06/16/23 however patient reports his blood sugars at home have come down to now all being <200 mg/dL however patient did not have log on him today. He does not follow with endocrinology.   Patient is currently in the coverage gap which led to difficulty affording Rybelsus. Patient reports that he makes too much money at this time for our  Patient Assistance Program.   He used a Isabela 3 sensor for the first 2 weeks after leaving the hospital and reported that he liked using it aside from the fact it wasn't always accurate to his blood glucose levels checked with his glucometer. We discussed that Dexcom is slightly more accurate and his insurance should cover it given he is on insulin. Prescriptions were sent to [employer] for the Dexcom G7 sensors and I will assist in getting the company the information they need to get the coverage approved.   Right now patient's blood glucose readings are much improved and I would like to see CGM reports in order to be able to adjust his insulin more accurately, especially since patient did not have his log with him today.   CONTINUE:   Lantus 100 unit/mL 15 units under the skin once daily   Fiasp 100 unit/mL 8 units under the skin 3 times a day with meals  Patient was encouraged to hold a dose of this insulin if his blood sugar is on the lower end (< mg/dL)  START: Dexcom G7 sensors apply 1 sensor to the back of the arm to monitor blood sugar continuously, remove and apply new sensor every 10 days  Patient also requested refills for his test strips which were sent to [facility] pharmacy.   Patient also is due for an appointment with his PCP Vera Ferrell. A message was sent with this  encounter to her making her aware of this, as patient reports that the office had to cancel his most recent scheduled appointment in November.   We will assess diet and exercise habits at next pharmacy encounter.   I will reach out to patient once I know about his Dexcom supplies and at that point we will determine the follow-up plan.          Relevant Medications    blood-glucose sensor (Dexcom G7 Sensor) device    blood sugar diagnostic strip     Pharmacy Follow-Up: To be determined pending coverage approval/denial for Dexcom CGM   PCP Follow-Up: None scheduled     Continue all meds under the continuation of care with the referring provider and clinical pharmacy team.    Nabil Spencer, Alex     Verbal consent to manage patient's drug therapy was obtained from the patient. They were informed they may decline to participate or withdraw from participation in pharmacy services at any time.

## 2023-12-12 NOTE — Clinical Note
Good evening Vera,   I spoke with Mr. Glaser for his post-discharge pharmacy appointment and will work on getting him the Dexcom CGM system. He mentioned that he had an appointment with you scheduled in November but that it was cancelled by the office. Is it possible for someone from the office to reach out to get him scheduled for his post-discharge appointment with you? I have also informed him that he should reach out as well.   Thank you!  Nabil Spencer, LulD

## 2023-12-28 DIAGNOSIS — E11.65 TYPE 2 DIABETES MELLITUS WITH HYPERGLYCEMIA, WITHOUT LONG-TERM CURRENT USE OF INSULIN (MULTI): ICD-10-CM

## 2023-12-28 RX ORDER — INSULIN ASPART INJECTION 100 [IU]/ML
8 INJECTION, SOLUTION SUBCUTANEOUS
Qty: 30 ML | Refills: 3 | Status: SHIPPED | OUTPATIENT
Start: 2023-12-28 | End: 2024-05-30 | Stop reason: WASHOUT

## 2024-01-08 ENCOUNTER — DOCUMENTATION (OUTPATIENT)
Dept: PHARMACY | Facility: HOSPITAL | Age: 79
End: 2024-01-08
Payer: COMMERCIAL

## 2024-01-08 DIAGNOSIS — E11.9 CONTROLLED TYPE 2 DIABETES MELLITUS WITHOUT COMPLICATION, UNSPECIFIED WHETHER LONG TERM INSULIN USE (MULTI): Primary | ICD-10-CM

## 2024-01-08 RX ORDER — BLOOD-GLUCOSE,RECEIVER,CONT
EACH MISCELLANEOUS
Qty: 1 EACH | Refills: 0 | Status: SHIPPED | OUTPATIENT
Start: 2024-01-08

## 2024-01-08 NOTE — PROGRESS NOTES
[employer] let me know that patient must fill a Dexcom  when being billed through Medicare. Prescription was submitted for Dexcom G7 . They also request chart notes be re-sent so they can work on coverage. Most recent PCP and Pharmacy chart notes were faxed over to them.     I will reach out in a few days to see the status of coverage for the Dexcom CGM system.     Thank you,     Nabil Spencer, PharmD

## 2024-01-25 ENCOUNTER — OFFICE VISIT (OUTPATIENT)
Dept: PRIMARY CARE | Facility: CLINIC | Age: 79
End: 2024-01-25
Payer: COMMERCIAL

## 2024-01-25 VITALS
OXYGEN SATURATION: 98 % | WEIGHT: 178 LBS | BODY MASS INDEX: 27.06 KG/M2 | HEART RATE: 99 BPM | DIASTOLIC BLOOD PRESSURE: 80 MMHG | SYSTOLIC BLOOD PRESSURE: 170 MMHG

## 2024-01-25 DIAGNOSIS — R21 RASH AND NONSPECIFIC SKIN ERUPTION: ICD-10-CM

## 2024-01-25 DIAGNOSIS — E11.69 TYPE 2 DIABETES MELLITUS WITH OTHER SPECIFIED COMPLICATION, WITHOUT LONG-TERM CURRENT USE OF INSULIN (MULTI): ICD-10-CM

## 2024-01-25 DIAGNOSIS — Z00.00 MEDICARE ANNUAL WELLNESS VISIT, SUBSEQUENT: Primary | ICD-10-CM

## 2024-01-25 DIAGNOSIS — E11.65 TYPE 2 DIABETES MELLITUS WITH HYPERGLYCEMIA, WITHOUT LONG-TERM CURRENT USE OF INSULIN (MULTI): ICD-10-CM

## 2024-01-25 DIAGNOSIS — E11.9 CONTROLLED TYPE 2 DIABETES MELLITUS WITHOUT COMPLICATION, UNSPECIFIED WHETHER LONG TERM INSULIN USE (MULTI): ICD-10-CM

## 2024-01-25 DIAGNOSIS — Z00.00 ROUTINE GENERAL MEDICAL EXAMINATION AT HEALTH CARE FACILITY: ICD-10-CM

## 2024-01-25 DIAGNOSIS — C61 MALIGNANT NEOPLASM OF PROSTATE (MULTI): ICD-10-CM

## 2024-01-25 PROCEDURE — 1170F FXNL STATUS ASSESSED: CPT | Performed by: NURSE PRACTITIONER

## 2024-01-25 PROCEDURE — 3077F SYST BP >= 140 MM HG: CPT | Performed by: NURSE PRACTITIONER

## 2024-01-25 PROCEDURE — 1160F RVW MEDS BY RX/DR IN RCRD: CPT | Performed by: NURSE PRACTITIONER

## 2024-01-25 PROCEDURE — 99214 OFFICE O/P EST MOD 30 MIN: CPT | Performed by: NURSE PRACTITIONER

## 2024-01-25 PROCEDURE — 3079F DIAST BP 80-89 MM HG: CPT | Performed by: NURSE PRACTITIONER

## 2024-01-25 PROCEDURE — 1159F MED LIST DOCD IN RCRD: CPT | Performed by: NURSE PRACTITIONER

## 2024-01-25 PROCEDURE — 1036F TOBACCO NON-USER: CPT | Performed by: NURSE PRACTITIONER

## 2024-01-25 PROCEDURE — 1125F AMNT PAIN NOTED PAIN PRSNT: CPT | Performed by: NURSE PRACTITIONER

## 2024-01-25 RX ORDER — HYDROXYZINE HYDROCHLORIDE 25 MG/1
25 TABLET, FILM COATED ORAL 3 TIMES DAILY PRN
Qty: 270 TABLET | Refills: 1 | Status: SHIPPED | OUTPATIENT
Start: 2024-01-25

## 2024-01-25 RX ORDER — INSULIN GLARGINE 100 [IU]/ML
15 INJECTION, SOLUTION SUBCUTANEOUS NIGHTLY
Qty: 4.5 ML | Refills: 11 | Status: SHIPPED | OUTPATIENT
Start: 2024-01-25 | End: 2024-05-30 | Stop reason: SDUPTHER

## 2024-01-25 ASSESSMENT — ACTIVITIES OF DAILY LIVING (ADL)
GROCERY_SHOPPING: INDEPENDENT
DRESSING: INDEPENDENT
BATHING: INDEPENDENT
TAKING_MEDICATION: INDEPENDENT
MANAGING_FINANCES: INDEPENDENT
DOING_HOUSEWORK: INDEPENDENT

## 2024-01-25 ASSESSMENT — ENCOUNTER SYMPTOMS
CONSTIPATION: 0
NAUSEA: 0
MUSCULOSKELETAL NEGATIVE: 1
FEVER: 0
CHILLS: 0
NUMBNESS: 0
FATIGUE: 0
COUGH: 0
SORE THROAT: 0
PSYCHIATRIC NEGATIVE: 1
SHORTNESS OF BREATH: 0
DIARRHEA: 0
PALPITATIONS: 0
WEAKNESS: 0
VOMITING: 0
ABDOMINAL PAIN: 0
DIZZINESS: 0
HEADACHES: 0

## 2024-01-25 ASSESSMENT — PATIENT HEALTH QUESTIONNAIRE - PHQ9
SUM OF ALL RESPONSES TO PHQ9 QUESTIONS 1 AND 2: 0
1. LITTLE INTEREST OR PLEASURE IN DOING THINGS: NOT AT ALL
2. FEELING DOWN, DEPRESSED OR HOPELESS: NOT AT ALL

## 2024-01-25 NOTE — PROGRESS NOTES
Subjective   Patient ID: Eleno Glaser is a 78 y.o. male who presents for Medicare Annual Wellness Visit Subsequent and follow up    HPI   Sore on sacral area comes and goes.  Does not hurt anymore.  Having trouble keeping blood sugars controlled.  Taking his insulin, discussed seeing endocrine again.  Will need to increase his insulin at home.  States insurance does not cover anything to help with his diabetes.  Denies chest pain, SOB, palpitations, dizziness,  or GI issues.  Taking medications as prescribed     Review of Systems   Constitutional:  Negative for chills, fatigue and fever.   HENT:  Negative for congestion, ear pain and sore throat.    Eyes:  Negative for visual disturbance.   Respiratory:  Negative for cough and shortness of breath.    Cardiovascular:  Negative for chest pain, palpitations and leg swelling.   Gastrointestinal:  Negative for abdominal pain, constipation, diarrhea, nausea and vomiting.   Genitourinary: Negative.    Musculoskeletal: Negative.    Skin:  Negative for rash.   Neurological:  Negative for dizziness, weakness, numbness and headaches.   Psychiatric/Behavioral: Negative.         Objective   /80   Pulse 99   Wt 80.7 kg (178 lb)   SpO2 98%   BMI 27.06 kg/m²     Physical Exam  Constitutional:       General: He is not in acute distress.     Appearance: Normal appearance.   HENT:      Head: Normocephalic and atraumatic.      Right Ear: Tympanic membrane, ear canal and external ear normal.      Left Ear: Tympanic membrane, ear canal and external ear normal.      Nose: Nose normal.      Mouth/Throat:      Mouth: Mucous membranes are moist.      Pharynx: Oropharynx is clear.   Eyes:      Extraocular Movements: Extraocular movements intact.      Conjunctiva/sclera: Conjunctivae normal.      Pupils: Pupils are equal, round, and reactive to light.   Cardiovascular:      Rate and Rhythm: Normal rate and regular rhythm.      Pulses: Normal pulses.      Heart sounds: Normal  heart sounds. No murmur heard.  Pulmonary:      Effort: Pulmonary effort is normal.      Breath sounds: Normal breath sounds. No wheezing, rhonchi or rales.   Abdominal:      General: Bowel sounds are normal.      Palpations: Abdomen is soft.      Tenderness: There is no abdominal tenderness.   Musculoskeletal:         General: Normal range of motion.      Cervical back: Normal range of motion and neck supple.   Lymphadenopathy:      Comments: No lymphadenopathy noted   Skin:     General: Skin is warm and dry.      Findings: No rash.   Neurological:      General: No focal deficit present.      Mental Status: He is alert and oriented to person, place, and time.      Cranial Nerves: No cranial nerve deficit.      Coordination: Coordination normal.      Gait: Gait normal.   Psychiatric:         Mood and Affect: Mood normal.         Behavior: Behavior normal.         Assessment/Plan   Problem List Items Addressed This Visit             ICD-10-CM    Type 2 diabetes mellitus with other specified complication, without long-term current use of insulin (CMS/[facility]) E11.69    Relevant Medications    insulin glargine (Lantus) 100 unit/mL (3 mL) pen    Medicare annual wellness visit, subsequent - Primary Z00.00    Malignant neoplasm of prostate (CMS/[facility]) C61     Managed           Rash and nonspecific skin eruption R21    Relevant Medications    hydrOXYzine HCL (Atarax) 25 mg tablet    Hyperglycemia due to diabetes mellitus (CMS/[facility]) E11.65     Managed            Other Visit Diagnoses         Codes    Controlled type 2 diabetes mellitus without complication, unspecified whether long term insulin use (CMS/[facility])     E11.9    Relevant Medications    insulin glargine (Lantus) 100 unit/mL (3 mL) pen        Have A1c drawn in 1 month.  Will call to discuss getting better control of DM, Dexcom, increasing insulin. Having trouble with insurance.  Follow up in 3 months or sooner if needed

## 2024-01-25 NOTE — PROGRESS NOTES
Subjective   Patient ID: Eleno Glaser is a 78 y.o. male who presents for Medicare Annual Wellness Visit Subsequent and follow up.    HPI         Review of Systems   Constitutional:  Negative for chills, fatigue and fever.   HENT:  Negative for congestion, ear pain and sore throat.    Eyes:  Negative for visual disturbance.   Respiratory:  Negative for cough and shortness of breath.    Cardiovascular:  Negative for chest pain, palpitations and leg swelling.   Gastrointestinal:  Negative for abdominal pain, constipation, diarrhea, nausea and vomiting.   Genitourinary: Negative.    Musculoskeletal: Negative.    Skin:  Negative for rash.   Neurological:  Negative for dizziness, weakness, numbness and headaches.   Psychiatric/Behavioral: Negative.         Objective   /80   Pulse 99   Wt 80.7 kg (178 lb)   SpO2 98%   BMI 27.06 kg/m²     Physical Exam  Constitutional:       General: He is not in acute distress.     Appearance: Normal appearance.   HENT:      Head: Normocephalic and atraumatic.      Right Ear: Tympanic membrane, ear canal and external ear normal.      Left Ear: Tympanic membrane, ear canal and external ear normal.      Nose: Nose normal.      Mouth/Throat:      Mouth: Mucous membranes are moist.      Pharynx: Oropharynx is clear.   Eyes:      Extraocular Movements: Extraocular movements intact.      Conjunctiva/sclera: Conjunctivae normal.      Pupils: Pupils are equal, round, and reactive to light.   Cardiovascular:      Rate and Rhythm: Normal rate and regular rhythm.      Pulses: Normal pulses.      Heart sounds: Normal heart sounds. No murmur heard.  Pulmonary:      Effort: Pulmonary effort is normal.      Breath sounds: Normal breath sounds. No wheezing, rhonchi or rales.   Abdominal:      General: Bowel sounds are normal.      Palpations: Abdomen is soft.      Tenderness: There is no abdominal tenderness.   Musculoskeletal:         General: Normal range of motion.      Cervical back:  Normal range of motion and neck supple.   Lymphadenopathy:      Comments: No lymphadenopathy noted   Skin:     General: Skin is warm and dry.      Findings: No rash.   Neurological:      General: No focal deficit present.      Mental Status: He is alert and oriented to person, place, and time.      Cranial Nerves: No cranial nerve deficit.      Coordination: Coordination normal.      Gait: Gait normal.   Psychiatric:         Mood and Affect: Mood normal.         Behavior: Behavior normal.       Assessment/Plan

## 2024-01-31 ENCOUNTER — TELEPHONE (OUTPATIENT)
Dept: PHARMACY | Facility: HOSPITAL | Age: 79
End: 2024-01-31
Payer: COMMERCIAL

## 2024-01-31 NOTE — TELEPHONE ENCOUNTER
After working with the [employer] company over the past 2 months, I reached out to inform patient that [employer] was finally able to get the Dexcom sensors approved through his insurance with the most recent office note from Vera Ferrell. They stated that his first fill would be ~$130 for 3 months of supplies and then after that the cost would drop off, as the cost is going towards his deductible.     Patient is not sure if this is something he wants to try at this time and stated he would think about it and call the DME company back if he decides to use the sensors for continuous glucose monitoring in place of his glucometer.     At last office-visit with eVra, she referred him to endocrinology for his diabetes and patient would like to schedule with Dr. Mcwilliams. At this time he declines further follow-up with the clinical pharmacy team and will instead reach out to Dr. Mcwilliams's office to schedule an appointment.     If further assistance with diabetes medication management is needed please have patient reach out to the clinical pharmacy team.     Thank you,     Nabil Spencer, PharmD

## 2024-02-07 ENCOUNTER — PATIENT OUTREACH (OUTPATIENT)
Dept: PRIMARY CARE | Facility: CLINIC | Age: 79
End: 2024-02-07
Payer: COMMERCIAL

## 2024-02-09 ENCOUNTER — LAB (OUTPATIENT)
Dept: LAB | Facility: LAB | Age: 79
End: 2024-02-09
Payer: COMMERCIAL

## 2024-02-09 DIAGNOSIS — E11.65 TYPE 2 DIABETES MELLITUS WITH HYPERGLYCEMIA, WITHOUT LONG-TERM CURRENT USE OF INSULIN (MULTI): ICD-10-CM

## 2024-02-09 PROCEDURE — 83036 HEMOGLOBIN GLYCOSYLATED A1C: CPT

## 2024-02-09 PROCEDURE — 36415 COLL VENOUS BLD VENIPUNCTURE: CPT

## 2024-02-10 LAB
EST. AVERAGE GLUCOSE BLD GHB EST-MCNC: 217 MG/DL
HBA1C MFR BLD: 9.2 %

## 2024-03-07 DIAGNOSIS — K21.9 GASTROESOPHAGEAL REFLUX DISEASE, UNSPECIFIED WHETHER ESOPHAGITIS PRESENT: ICD-10-CM

## 2024-03-07 NOTE — TELEPHONE ENCOUNTER
Requested Prescriptions     Pending Prescriptions Disp Refills    esomeprazole (NexIUM) 40 mg DR capsule 90 capsule 0     Sig: TAKE 1 CAPSULE BY MOUTH EVERY DAY BEFORE BREAKFAST

## 2024-03-08 RX ORDER — ESOMEPRAZOLE MAGNESIUM 40 MG/1
CAPSULE, DELAYED RELEASE ORAL
Qty: 30 CAPSULE | Refills: 0 | Status: SHIPPED | OUTPATIENT
Start: 2024-03-08 | End: 2024-04-11 | Stop reason: SDUPTHER

## 2024-03-11 DIAGNOSIS — E78.5 HYPERLIPIDEMIA, UNSPECIFIED HYPERLIPIDEMIA TYPE: ICD-10-CM

## 2024-03-12 RX ORDER — ATORVASTATIN CALCIUM 40 MG/1
TABLET, FILM COATED ORAL
Qty: 90 TABLET | Refills: 0 | Status: SHIPPED | OUTPATIENT
Start: 2024-03-12 | End: 2024-05-08

## 2024-04-09 DIAGNOSIS — I10 HYPERTENSION, UNSPECIFIED TYPE: ICD-10-CM

## 2024-04-09 RX ORDER — METOPROLOL SUCCINATE 25 MG/1
25 TABLET, EXTENDED RELEASE ORAL DAILY
Qty: 90 TABLET | Refills: 3 | Status: SHIPPED | OUTPATIENT
Start: 2024-04-09

## 2024-04-11 DIAGNOSIS — K21.9 GASTROESOPHAGEAL REFLUX DISEASE, UNSPECIFIED WHETHER ESOPHAGITIS PRESENT: ICD-10-CM

## 2024-04-11 RX ORDER — ESOMEPRAZOLE MAGNESIUM 40 MG/1
CAPSULE, DELAYED RELEASE ORAL
Qty: 90 CAPSULE | Refills: 1 | Status: SHIPPED | OUTPATIENT
Start: 2024-04-11

## 2024-04-11 NOTE — TELEPHONE ENCOUNTER
Requested Prescriptions     Pending Prescriptions Disp Refills    esomeprazole (NexIUM) 40 mg DR capsule 90 capsule 1     Sig: TAKE 1 CAPSULE BY MOUTH EVERY DAY BEFORE BREAKFAST

## 2024-04-29 DIAGNOSIS — C61 PROSTATE CANCER (MULTI): ICD-10-CM

## 2024-04-29 RX ORDER — TAMSULOSIN HYDROCHLORIDE 0.4 MG/1
CAPSULE ORAL
Qty: 60 CAPSULE | Refills: 3 | Status: SHIPPED | OUTPATIENT
Start: 2024-04-29

## 2024-05-06 DIAGNOSIS — E78.5 HYPERLIPIDEMIA, UNSPECIFIED HYPERLIPIDEMIA TYPE: ICD-10-CM

## 2024-05-08 RX ORDER — ATORVASTATIN CALCIUM 40 MG/1
40 TABLET, FILM COATED ORAL NIGHTLY
Qty: 90 TABLET | Refills: 3 | Status: SHIPPED | OUTPATIENT
Start: 2024-05-08

## 2024-05-30 ENCOUNTER — OFFICE VISIT (OUTPATIENT)
Dept: PRIMARY CARE | Facility: CLINIC | Age: 79
End: 2024-05-30
Payer: COMMERCIAL

## 2024-05-30 VITALS
HEART RATE: 90 BPM | OXYGEN SATURATION: 96 % | SYSTOLIC BLOOD PRESSURE: 181 MMHG | BODY MASS INDEX: 30.11 KG/M2 | WEIGHT: 198 LBS | DIASTOLIC BLOOD PRESSURE: 84 MMHG

## 2024-05-30 DIAGNOSIS — I10 HYPERTENSION, UNSPECIFIED TYPE: ICD-10-CM

## 2024-05-30 DIAGNOSIS — E11.9 CONTROLLED TYPE 2 DIABETES MELLITUS WITHOUT COMPLICATION, UNSPECIFIED WHETHER LONG TERM INSULIN USE (MULTI): ICD-10-CM

## 2024-05-30 DIAGNOSIS — E11.69 TYPE 2 DIABETES MELLITUS WITH OTHER SPECIFIED COMPLICATION, WITHOUT LONG-TERM CURRENT USE OF INSULIN (MULTI): Primary | ICD-10-CM

## 2024-05-30 PROCEDURE — 99214 OFFICE O/P EST MOD 30 MIN: CPT | Performed by: NURSE PRACTITIONER

## 2024-05-30 PROCEDURE — 1159F MED LIST DOCD IN RCRD: CPT | Performed by: NURSE PRACTITIONER

## 2024-05-30 PROCEDURE — 1160F RVW MEDS BY RX/DR IN RCRD: CPT | Performed by: NURSE PRACTITIONER

## 2024-05-30 PROCEDURE — 3077F SYST BP >= 140 MM HG: CPT | Performed by: NURSE PRACTITIONER

## 2024-05-30 PROCEDURE — 3079F DIAST BP 80-89 MM HG: CPT | Performed by: NURSE PRACTITIONER

## 2024-05-30 RX ORDER — INSULIN GLARGINE 100 [IU]/ML
18 INJECTION, SOLUTION SUBCUTANEOUS NIGHTLY
Qty: 4.5 ML | Refills: 11 | Status: SHIPPED | OUTPATIENT
Start: 2024-05-30

## 2024-05-30 RX ORDER — METFORMIN HYDROCHLORIDE 500 MG/1
500 TABLET ORAL
Qty: 180 TABLET | Refills: 3 | Status: SHIPPED | OUTPATIENT
Start: 2024-05-30

## 2024-05-30 RX ORDER — INSULIN LISPRO 100 [IU]/ML
10 INJECTION, SOLUTION INTRAVENOUS; SUBCUTANEOUS
Qty: 5 EACH | Refills: 5 | Status: SHIPPED | OUTPATIENT
Start: 2024-05-30

## 2024-05-30 RX ORDER — FOSINOPIRL SODIUM 10 MG/1
10 TABLET ORAL DAILY
Qty: 90 TABLET | Refills: 1 | Status: SHIPPED | OUTPATIENT
Start: 2024-05-30

## 2024-05-30 ASSESSMENT — ENCOUNTER SYMPTOMS
NEUROLOGICAL NEGATIVE: 1
GASTROINTESTINAL NEGATIVE: 1
CARDIOVASCULAR NEGATIVE: 1
RESPIRATORY NEGATIVE: 1
PSYCHIATRIC NEGATIVE: 1
EYES NEGATIVE: 1
CONSTITUTIONAL NEGATIVE: 1

## 2024-05-30 NOTE — PROGRESS NOTES
Subjective   Patient ID: Eleno Glaser is a 78 y.o. male who presents for diabetes medications.    HPI   Frustrated because he can not get into endocrine until August.  He has been logging sugars at home on monitor and phone.  He has also been able to start Metformin at a lower dose without GI symptoms. Started it about a month or so ago.  Feels like he is doing much better with his blood sugars.  Denies chest pain, SOB, palpitations, dizziness,  or GI issues.      Review of Systems   Constitutional: Negative.    HENT: Negative.     Eyes: Negative.    Respiratory: Negative.     Cardiovascular: Negative.    Gastrointestinal: Negative.    Genitourinary: Negative.    Skin: Negative.    Neurological: Negative.    Psychiatric/Behavioral: Negative.         Objective   BP (!) 181/84   Pulse 90   Wt 89.8 kg (198 lb)   SpO2 96%   BMI 30.11 kg/m²     Physical Exam  Constitutional:       General: He is not in acute distress.     Appearance: Normal appearance.   HENT:      Head: Normocephalic and atraumatic.      Right Ear: Tympanic membrane, ear canal and external ear normal.      Left Ear: Tympanic membrane, ear canal and external ear normal.      Nose: Nose normal.      Mouth/Throat:      Mouth: Mucous membranes are moist.      Pharynx: Oropharynx is clear.   Eyes:      Extraocular Movements: Extraocular movements intact.      Conjunctiva/sclera: Conjunctivae normal.      Pupils: Pupils are equal, round, and reactive to light.   Cardiovascular:      Rate and Rhythm: Normal rate and regular rhythm.      Pulses: Normal pulses.      Heart sounds: Normal heart sounds. No murmur heard.  Pulmonary:      Effort: Pulmonary effort is normal.      Breath sounds: Normal breath sounds. No wheezing, rhonchi or rales.   Abdominal:      General: Bowel sounds are normal.      Palpations: Abdomen is soft.      Tenderness: There is no abdominal tenderness.   Musculoskeletal:         General: Normal range of motion.      Cervical back:  Normal range of motion and neck supple.   Lymphadenopathy:      Comments: No lymphadenopathy noted   Skin:     General: Skin is warm and dry.      Findings: No rash.   Neurological:      General: No focal deficit present.      Mental Status: He is alert and oriented to person, place, and time.      Cranial Nerves: No cranial nerve deficit.      Coordination: Coordination normal.      Gait: Gait normal.   Psychiatric:         Mood and Affect: Mood normal.         Behavior: Behavior normal.         Assessment/Plan   Problem List Items Addressed This Visit             ICD-10-CM    Type 2 diabetes mellitus with other specified complication, without long-term current use of insulin (Multi) - Primary E11.69    Relevant Medications    insulin glargine (Lantus) 100 unit/mL (3 mL) pen    metFORMIN (Glucophage) 500 mg tablet    insulin lispro (HumaLOG) 100 unit/mL injection    Other Relevant Orders    Hemoglobin A1C     Other Visit Diagnoses         Codes    Controlled type 2 diabetes mellitus without complication, unspecified whether long term insulin use (Multi)     E11.9    Relevant Medications    insulin glargine (Lantus) 100 unit/mL (3 mL) pen    Hypertension, unspecified type     I10    Relevant Medications    fosinopril (Monopril) 10 mg tablet        Follow up in 3 months or sooner if needed

## 2024-06-03 ENCOUNTER — LAB (OUTPATIENT)
Dept: LAB | Facility: LAB | Age: 79
End: 2024-06-03
Payer: COMMERCIAL

## 2024-06-03 DIAGNOSIS — E11.69 TYPE 2 DIABETES MELLITUS WITH OTHER SPECIFIED COMPLICATION, WITHOUT LONG-TERM CURRENT USE OF INSULIN (MULTI): ICD-10-CM

## 2024-06-03 PROCEDURE — 83036 HEMOGLOBIN GLYCOSYLATED A1C: CPT

## 2024-06-03 PROCEDURE — 36415 COLL VENOUS BLD VENIPUNCTURE: CPT

## 2024-06-04 LAB
EST. AVERAGE GLUCOSE BLD GHB EST-MCNC: 212 MG/DL
HBA1C MFR BLD: 9 %

## 2024-06-13 DIAGNOSIS — E11.69 TYPE 2 DIABETES MELLITUS WITH OTHER SPECIFIED COMPLICATION, WITHOUT LONG-TERM CURRENT USE OF INSULIN (MULTI): ICD-10-CM

## 2024-06-13 DIAGNOSIS — E78.5 DYSLIPIDEMIA: Primary | ICD-10-CM

## 2024-06-13 DIAGNOSIS — C61 MALIGNANT NEOPLASM OF PROSTATE (MULTI): ICD-10-CM

## 2024-06-13 DIAGNOSIS — E78.5 HYPERLIPIDEMIA, UNSPECIFIED HYPERLIPIDEMIA TYPE: ICD-10-CM

## 2024-07-07 DIAGNOSIS — C61 PROSTATE CANCER (MULTI): ICD-10-CM

## 2024-07-08 RX ORDER — TAMSULOSIN HYDROCHLORIDE 0.4 MG/1
CAPSULE ORAL
Qty: 180 CAPSULE | Refills: 0 | Status: SHIPPED | OUTPATIENT
Start: 2024-07-08

## 2024-08-07 DIAGNOSIS — I10 HYPERTENSION, UNSPECIFIED TYPE: ICD-10-CM

## 2024-08-08 RX ORDER — FOSINOPIRL SODIUM 10 MG/1
10 TABLET ORAL DAILY
Qty: 100 TABLET | Refills: 0 | Status: SHIPPED | OUTPATIENT
Start: 2024-08-08

## 2024-08-11 DIAGNOSIS — E11.9 CONTROLLED TYPE 2 DIABETES MELLITUS WITHOUT COMPLICATION, UNSPECIFIED WHETHER LONG TERM INSULIN USE (MULTI): ICD-10-CM

## 2024-08-12 RX ORDER — BLOOD-GLUCOSE METER
EACH MISCELLANEOUS
Qty: 300 STRIP | Refills: 1 | Status: SHIPPED | OUTPATIENT
Start: 2024-08-12

## 2024-08-27 ENCOUNTER — APPOINTMENT (OUTPATIENT)
Dept: ENDOCRINOLOGY | Facility: CLINIC | Age: 79
End: 2024-08-27
Payer: COMMERCIAL

## 2024-09-17 ENCOUNTER — LAB (OUTPATIENT)
Dept: LAB | Facility: LAB | Age: 79
End: 2024-09-17
Payer: COMMERCIAL

## 2024-09-19 ENCOUNTER — LAB (OUTPATIENT)
Dept: LAB | Facility: LAB | Age: 79
End: 2024-09-19
Payer: COMMERCIAL

## 2024-09-19 DIAGNOSIS — E78.5 HYPERLIPIDEMIA, UNSPECIFIED HYPERLIPIDEMIA TYPE: ICD-10-CM

## 2024-09-19 DIAGNOSIS — E11.69 TYPE 2 DIABETES MELLITUS WITH OTHER SPECIFIED COMPLICATION, WITHOUT LONG-TERM CURRENT USE OF INSULIN: ICD-10-CM

## 2024-09-19 DIAGNOSIS — C61 MALIGNANT NEOPLASM OF PROSTATE (MULTI): ICD-10-CM

## 2024-09-19 LAB
ALBUMIN SERPL BCP-MCNC: 4 G/DL (ref 3.4–5)
ALP SERPL-CCNC: 68 U/L (ref 33–136)
ALT SERPL W P-5'-P-CCNC: 16 U/L (ref 10–52)
ANION GAP SERPL CALC-SCNC: 15 MMOL/L (ref 10–20)
AST SERPL W P-5'-P-CCNC: 14 U/L (ref 9–39)
BASOPHILS # BLD AUTO: 0.07 X10*3/UL (ref 0–0.1)
BASOPHILS NFR BLD AUTO: 1.4 %
BILIRUB SERPL-MCNC: 0.7 MG/DL (ref 0–1.2)
BUN SERPL-MCNC: 18 MG/DL (ref 6–23)
CALCIUM SERPL-MCNC: 9.1 MG/DL (ref 8.6–10.3)
CHLORIDE SERPL-SCNC: 102 MMOL/L (ref 98–107)
CHOLEST SERPL-MCNC: 165 MG/DL (ref 0–199)
CHOLESTEROL/HDL RATIO: 4.1
CK SERPL-CCNC: 83 U/L (ref 0–325)
CO2 SERPL-SCNC: 23 MMOL/L (ref 21–32)
CREAT SERPL-MCNC: 1.1 MG/DL (ref 0.5–1.3)
EGFRCR SERPLBLD CKD-EPI 2021: 69 ML/MIN/1.73M*2
EOSINOPHIL # BLD AUTO: 0.27 X10*3/UL (ref 0–0.4)
EOSINOPHIL NFR BLD AUTO: 5.5 %
ERYTHROCYTE [DISTWIDTH] IN BLOOD BY AUTOMATED COUNT: 13.1 % (ref 11.5–14.5)
EST. AVERAGE GLUCOSE BLD GHB EST-MCNC: 203 MG/DL
GLUCOSE SERPL-MCNC: 197 MG/DL (ref 74–99)
HBA1C MFR BLD: 8.7 %
HCT VFR BLD AUTO: 44.3 % (ref 41–52)
HDLC SERPL-MCNC: 40.3 MG/DL
HGB BLD-MCNC: 14.6 G/DL (ref 13.5–17.5)
IMM GRANULOCYTES # BLD AUTO: 0.03 X10*3/UL (ref 0–0.5)
IMM GRANULOCYTES NFR BLD AUTO: 0.6 % (ref 0–0.9)
LDLC SERPL CALC-MCNC: 90 MG/DL
LYMPHOCYTES # BLD AUTO: 1 X10*3/UL (ref 0.8–3)
LYMPHOCYTES NFR BLD AUTO: 20.3 %
MCH RBC QN AUTO: 29.7 PG (ref 26–34)
MCHC RBC AUTO-ENTMCNC: 33 G/DL (ref 32–36)
MCV RBC AUTO: 90 FL (ref 80–100)
MONOCYTES # BLD AUTO: 0.53 X10*3/UL (ref 0.05–0.8)
MONOCYTES NFR BLD AUTO: 10.8 %
NEUTROPHILS # BLD AUTO: 3.03 X10*3/UL (ref 1.6–5.5)
NEUTROPHILS NFR BLD AUTO: 61.4 %
NON HDL CHOLESTEROL: 125 MG/DL (ref 0–149)
NRBC BLD-RTO: 0 /100 WBCS (ref 0–0)
PLATELET # BLD AUTO: 322 X10*3/UL (ref 150–450)
POTASSIUM SERPL-SCNC: 5 MMOL/L (ref 3.5–5.3)
PROT SERPL-MCNC: 6.4 G/DL (ref 6.4–8.2)
PSA SERPL-MCNC: <0.1 NG/ML
RBC # BLD AUTO: 4.92 X10*6/UL (ref 4.5–5.9)
SODIUM SERPL-SCNC: 135 MMOL/L (ref 136–145)
TRIGL SERPL-MCNC: 176 MG/DL (ref 0–149)
TSH SERPL-ACNC: 3.13 MIU/L (ref 0.44–3.98)
VLDL: 35 MG/DL (ref 0–40)
WBC # BLD AUTO: 4.9 X10*3/UL (ref 4.4–11.3)

## 2024-09-19 PROCEDURE — 82550 ASSAY OF CK (CPK): CPT

## 2024-09-19 PROCEDURE — 80053 COMPREHEN METABOLIC PANEL: CPT

## 2024-09-19 PROCEDURE — 80061 LIPID PANEL: CPT

## 2024-09-19 PROCEDURE — 85025 COMPLETE CBC W/AUTO DIFF WBC: CPT

## 2024-09-19 PROCEDURE — 36415 COLL VENOUS BLD VENIPUNCTURE: CPT

## 2024-09-19 PROCEDURE — 83036 HEMOGLOBIN GLYCOSYLATED A1C: CPT

## 2024-09-19 PROCEDURE — 84153 ASSAY OF PSA TOTAL: CPT

## 2024-09-19 PROCEDURE — 84443 ASSAY THYROID STIM HORMONE: CPT

## 2024-09-26 DIAGNOSIS — K21.9 GASTROESOPHAGEAL REFLUX DISEASE, UNSPECIFIED WHETHER ESOPHAGITIS PRESENT: ICD-10-CM

## 2024-10-04 RX ORDER — ESOMEPRAZOLE MAGNESIUM 40 MG/1
CAPSULE, DELAYED RELEASE ORAL
Qty: 90 CAPSULE | Refills: 1 | Status: SHIPPED | OUTPATIENT
Start: 2024-10-04

## 2024-10-16 DIAGNOSIS — E11.9 CONTROLLED TYPE 2 DIABETES MELLITUS WITHOUT COMPLICATION, UNSPECIFIED WHETHER LONG TERM INSULIN USE (MULTI): ICD-10-CM

## 2024-10-20 DIAGNOSIS — C61 PROSTATE CANCER (MULTI): ICD-10-CM

## 2024-10-23 RX ORDER — TAMSULOSIN HYDROCHLORIDE 0.4 MG/1
CAPSULE ORAL
Qty: 180 CAPSULE | Refills: 0 | Status: SHIPPED | OUTPATIENT
Start: 2024-10-23

## 2024-10-24 RX ORDER — INSULIN GLARGINE 100 [IU]/ML
INJECTION, SOLUTION SUBCUTANEOUS
Qty: 30 ML | Refills: 0 | Status: SHIPPED | OUTPATIENT
Start: 2024-10-24

## 2024-10-25 DIAGNOSIS — K21.9 GASTROESOPHAGEAL REFLUX DISEASE, UNSPECIFIED WHETHER ESOPHAGITIS PRESENT: ICD-10-CM

## 2024-10-28 RX ORDER — ESOMEPRAZOLE MAGNESIUM 40 MG/1
CAPSULE, DELAYED RELEASE ORAL
Qty: 90 CAPSULE | Refills: 0 | Status: SHIPPED | OUTPATIENT
Start: 2024-10-28

## 2024-11-06 DIAGNOSIS — E11.9 CONTROLLED TYPE 2 DIABETES MELLITUS WITHOUT COMPLICATION, UNSPECIFIED WHETHER LONG TERM INSULIN USE (MULTI): ICD-10-CM

## 2024-11-06 RX ORDER — INSULIN GLARGINE 100 [IU]/ML
18 INJECTION, SOLUTION SUBCUTANEOUS NIGHTLY
Qty: 30 ML | Refills: 0 | Status: SHIPPED | OUTPATIENT
Start: 2024-11-06

## 2024-11-30 DIAGNOSIS — I10 HYPERTENSION, UNSPECIFIED TYPE: ICD-10-CM

## 2024-12-04 RX ORDER — FOSINOPIRL SODIUM 10 MG/1
10 TABLET ORAL DAILY
Qty: 100 TABLET | Refills: 0 | Status: SHIPPED | OUTPATIENT
Start: 2024-12-04

## 2024-12-26 DIAGNOSIS — I10 HYPERTENSION, UNSPECIFIED TYPE: ICD-10-CM

## 2024-12-26 RX ORDER — METOPROLOL SUCCINATE 25 MG/1
25 TABLET, EXTENDED RELEASE ORAL DAILY
Qty: 30 TABLET | Refills: 0 | Status: SHIPPED | OUTPATIENT
Start: 2024-12-26

## 2024-12-29 DIAGNOSIS — K21.9 GASTROESOPHAGEAL REFLUX DISEASE, UNSPECIFIED WHETHER ESOPHAGITIS PRESENT: ICD-10-CM

## 2024-12-30 ENCOUNTER — APPOINTMENT (OUTPATIENT)
Dept: ENDOCRINOLOGY | Facility: CLINIC | Age: 79
End: 2024-12-30
Payer: COMMERCIAL

## 2024-12-30 VITALS
WEIGHT: 204 LBS | HEIGHT: 68 IN | SYSTOLIC BLOOD PRESSURE: 148 MMHG | BODY MASS INDEX: 30.92 KG/M2 | DIASTOLIC BLOOD PRESSURE: 86 MMHG

## 2024-12-30 DIAGNOSIS — E11.69 TYPE 2 DIABETES MELLITUS WITH OTHER SPECIFIED COMPLICATION, WITHOUT LONG-TERM CURRENT USE OF INSULIN: Primary | ICD-10-CM

## 2024-12-30 DIAGNOSIS — E78.5 HYPERLIPIDEMIA, UNSPECIFIED HYPERLIPIDEMIA TYPE: ICD-10-CM

## 2024-12-30 DIAGNOSIS — I10 BENIGN ESSENTIAL HYPERTENSION: ICD-10-CM

## 2024-12-30 DIAGNOSIS — E11.9 CONTROLLED TYPE 2 DIABETES MELLITUS WITHOUT COMPLICATION, UNSPECIFIED WHETHER LONG TERM INSULIN USE (MULTI): Primary | ICD-10-CM

## 2024-12-30 LAB — POC HEMOGLOBIN A1C: 9 % (ref 4.2–6.5)

## 2024-12-30 RX ORDER — DEXTROSE 4 G
1 TABLET,CHEWABLE ORAL ONCE
Qty: 1 EACH | Refills: 0 | Status: SHIPPED | OUTPATIENT
Start: 2024-12-30 | End: 2024-12-30

## 2024-12-30 RX ORDER — ESOMEPRAZOLE MAGNESIUM 40 MG/1
CAPSULE, DELAYED RELEASE ORAL
Qty: 30 CAPSULE | Refills: 0 | Status: SHIPPED | OUTPATIENT
Start: 2024-12-30

## 2024-12-30 NOTE — PROGRESS NOTES
Patient ID: Eleno Glaser is a 79 y.o. male who presents for New Patient Visit (Endocrine consult. Here today for his diabetes.).  HPI  The patient is referred for evaluation of type 2 diabetes on insulin uncontrolled.    This is a 79-year-old gentleman has had diabetes since the early 80s.    It is complicated by neuropathy and mild renal insufficiency.    He is currently taking metformin 500 mg twice daily.    He has been on 1000 g twice daily which he did not tolerate.    At 1 point he was also on Trulicity which was cost prohibitive.    He is currently taking Lantus 20 units and Humalog 12 units with each meal.    He is checking blood sugars once or twice per day typically at lunch.    Prior to lunch his sugars have been in the 200s after lunch they have been under much better control.    He had a hemoglobin A1c in September that was 8.7%.    Of note in November 2023 he had a hemoglobin A1c of 17.6% which is when the insulin was started.    He is not following a specific diet but is keeping an eye on his carbohydrates.    He is not exercising.    He has a past history of hypertension hyperlipidemia cataracts Rojas's esophagus kidney stone.    Socially he is  retired non-smoker nondrinker.    Family history positive for diabetes in his mother and father and maternal grandmother.    ROS  Comprehensive review of systems is negative.    Objective   Physical Exam  Visit Vitals  /86      Vitals:    12/30/24 1305   Weight: 92.5 kg (204 lb)      Body mass index is 31.02 kg/m².      Alert and oriented x3  In no distress  No focal neurologic deficits  No supraclavicular or dorsal fat  No purple striae  Integument intact    ENT normal. No adenopathy  Fundi poorly visualized  Thyroid palpable and normal. No nodules  Chest clear to auscultation  Heart sounds are normal  Abdomen nontender. Bowel sounds normal. No organomegaly  Feet are okay  Decreased vibration and monofilament sensation normal pulses, no  "lesions    Current Outpatient Medications   Medication Sig Dispense Refill    alpha lipoic acid 600 mg capsule Take 1 capsule by mouth once daily.      aspirin 81 mg EC tablet Take 1 tablet (81 mg) by mouth once daily.      aspirin-acetaminophen-caffeine (Excedrin Migraine) 250-250-65 mg tablet Take 1 tablet by mouth once daily.      atorvastatin (Lipitor) 40 mg tablet TAKE 1 TABLET BY MOUTH EVERY  NIGHT 90 tablet 3    esomeprazole (NexIUM) 40 mg DR capsule TAKE 1 CAPSULE BY MOUTH EVERY DAY BEFORE BREAKFAST 90 capsule 0    ferrous sulfate 325 (65 Fe) MG tablet Take 1 tablet (325 mg) by mouth once daily.      fluticasone (Flonase Allergy Relief) 50 mcg/actuation nasal spray Administer 2 sprays into affected nostril(s) once daily.      fosinopril (Monopril) 10 mg tablet TAKE 1 TABLET BY MOUTH ONCE  DAILY 100 tablet 0    hydrOXYzine HCL (Atarax) 25 mg tablet Take 1 tablet (25 mg) by mouth 3 times a day as needed for anxiety or itching. 270 tablet 1    ibuprofen 800 mg tablet Take 1 tablet (800 mg) by mouth every 8 hours if needed for mild pain (1 - 3).      insulin glargine (Lantus Solostar U-100 Insulin) 100 unit/mL (3 mL) pen Inject 18 Units under the skin once daily at bedtime. 30 mL 0    insulin lispro (HumaLOG) 100 unit/mL injection Inject 10 Units under the skin 3 times daily (morning, midday, late afternoon). 5 pens in pack 5 refills Take as directed per insulin instructions. 5 each 5    metFORMIN (Glucophage) 500 mg tablet Take 1 tablet (500 mg) by mouth 2 times daily (morning and late afternoon). 180 tablet 3    metoprolol succinate XL (Toprol-XL) 25 mg 24 hr tablet Take 1 tablet (25 mg) by mouth once daily. 30 tablet 0    Myrbetriq 50 mg tablet extended release 24 hr 24 hr tablet Take 1 tablet (50 mg) by mouth once daily.      OneTouch Verio test strips strip USE 1 EACH 3 TIMES A DAY BEFORE MEALS. 300 strip 1    pen needle, diabetic 31 gauge x 5/16\" needle To be used 4 times daily with insulin shots 200 each " 0    tamsulosin (Flomax) 0.4 mg 24 hr capsule TAKE 1 CAPSULE BY MOUTH IN THE  MORNING AND 1 CAPSULE BY MOUTH  AT BEDTIME 180 capsule 0     No current facility-administered medications for this visit.       Assessment/Plan     1.  Type 2 diabetes on insulin uncontrolled  2.  Hypertension  3.  Hyperlipidemia    We discussed the pathophysiology of diabetes, insulin resistance and insulin insufficiency. We discussed risks for complications, goals for treatment, as well as options for treatment.    We discussed bolus and basal requirements.    We discussed that at this point there is not enough data to adjust.    I have advised that he start checking before breakfast lunch dinner and bedtime and then a week or 2 send me the data and at that point we can adjust the insulin accordingly.    We discussed goals for this gentleman to generally be between 100-180.    We discussed hypoglycemia.    He will follow-up with me in 3 months sooner as needed.    I spent 60 minutes with this patient.  Greater than 50% of this time was spent in counseling and/or coordination of care.

## 2025-01-05 NOTE — PROGRESS NOTES
"Subjective   Patient ID: Eleno Glaser is a 79 y.o. male who presents for Pre-op Exam.    HPI   Patient is here for presurgical clearance for catarhact. Surgery is not scheduled yet.  Had surgery in the past, but no complications.   His catheterization was normal 2021, no cardiac problem noted at that time.   Patient declined congestive heart failure, chronic kidney disease, smoking /COPD.    Type 2 diabetes mellitus  Patient was seen recently by endocrino, Dr Bray due to his A1c being 9 (done in the last week).   Patient currently is taking insuline glargine 18 units per day  Insulin lispro 3 units and metformin 500 mg twice daily    Dyslipidemia  Patient is taking atorvastatin 40 mg daily  Does not report any side effect from medication    Hypertension  His BP today is not at goal 144/80 mmHg  Patient does report having whitecoat syndrome.  Currently is taking fosinopril 10 mg and metoprolol 25 mg tablet  Does not report any side effect      Filiberto esophagitis   Diagnosed in 2021.   he is taking Nexium 40 mg tablet  Patient reported good control of his symptoms with this medication  Last EGD was in 2/2021 recommendation were to follow up in 5 years     BPH  Currently is taking tamsulosine 0.4 mg  and myrbetriq     Review of Systems  Patient declined chest pain, shortness of breath, cough, fever, nausea, abdominal pain, lower leg edema, headache, fever.    Objective   /80   Pulse 96   Ht 1.727 m (5' 8\")   Wt 93 kg (205 lb)   SpO2 99%   BMI 31.17 kg/m²     Physical Exam  General: Alert and oriented. Appears well-nourished and in no acute distress, overweight  Eyes: PERRLA. EOMI.  Head/neck: Normocephalic. Supple.  Lymphatics: No cervical lymphadenopathy.  Respiratory/Thorax: Clear to auscultation bilaterally. No wheezing.   Cardiovascular: Regular rate and rhythm. No murmurs.  Gastrointestinal: Soft, nontender, nondistended. +BS   Musculoskeletal: ROM intact. No joint swelling. Normal strength "   Extremities: Warm and well perfused. No peripheral edema.  Neurological: No gross neurologic deficits.   Psychological: Appropriate mood and affect.   Skin: No visible rashes or lesions.    Assessment/Plan   He is 79 years old man with past medical history of type 2 diabetes mellitus, hypertension, dyslipidemia, Rojas's esophagitis, BPH who presented today to the office for surgical clearance      RCRI is  1.   Discussed with the patient his risk for the surgery.    Having A1c which is not at goal yet but is trending down from 17- 9, it increase risk for complication despite the fact that cataract is low risk procedure.   Patient was advised to keep an eye on A1c to have at least close to 8.   Advised to work with his endocrine doc and work towards diet and exercise.       HTN  BP was 144/80 mmHg(on repeat measurements).  Continue with fosinopril 10 mg daily and metoprolol 25 mg tablet daily  Patient was advised to measure the blood pressure for a week at home and let us know.   Try to exercise and improve your diet will decrease the weight and also will decrease the blood pressure.   Patient is willing to do more exercise with stationary bike.     DLP  Continue with atorvastatin 40 mg tablet daily    BPH   Continue with tamsulosine and myrbetriq     Uncontrolled type 2 DM  He sees Dr. Bray  Currently on insulin glargine 18 units and lispro 3 units, plus metformin 500 mg twice daily.   Patient was advised to continue monitoring A1c and work towards having a lower number, around 8 and below would be better.     Filiberto esophagitis  Continue following with GI doctor every 5 years  Continue with Nexium 40 mg tablet.    Follow up in 4 months for MCR or sooner for HTN based on BP at home.       I discussed my plan with my attending, Dr Gilmar Travis. MD  Family medicine resident  PGY3     no

## 2025-01-09 ENCOUNTER — APPOINTMENT (OUTPATIENT)
Dept: PRIMARY CARE | Facility: CLINIC | Age: 80
End: 2025-01-09
Payer: COMMERCIAL

## 2025-01-09 VITALS
OXYGEN SATURATION: 99 % | BODY MASS INDEX: 31.07 KG/M2 | DIASTOLIC BLOOD PRESSURE: 80 MMHG | SYSTOLIC BLOOD PRESSURE: 144 MMHG | WEIGHT: 205 LBS | HEIGHT: 68 IN | HEART RATE: 96 BPM

## 2025-01-09 DIAGNOSIS — I10 HYPERTENSION, UNSPECIFIED TYPE: ICD-10-CM

## 2025-01-09 DIAGNOSIS — K22.70 BARRETT'S ESOPHAGUS WITHOUT DYSPLASIA: ICD-10-CM

## 2025-01-09 DIAGNOSIS — Z01.818 PRE-OPERATIVE CLEARANCE: Primary | ICD-10-CM

## 2025-01-09 DIAGNOSIS — E78.5 HYPERLIPIDEMIA, UNSPECIFIED HYPERLIPIDEMIA TYPE: ICD-10-CM

## 2025-01-09 ASSESSMENT — PATIENT HEALTH QUESTIONNAIRE - PHQ9
1. LITTLE INTEREST OR PLEASURE IN DOING THINGS: NOT AT ALL
SUM OF ALL RESPONSES TO PHQ9 QUESTIONS 1 AND 2: 0
2. FEELING DOWN, DEPRESSED OR HOPELESS: NOT AT ALL

## 2025-01-20 ENCOUNTER — APPOINTMENT (OUTPATIENT)
Dept: PRIMARY CARE | Facility: CLINIC | Age: 80
End: 2025-01-20
Payer: COMMERCIAL

## 2025-01-21 DIAGNOSIS — C61 PROSTATE CANCER (MULTI): ICD-10-CM

## 2025-01-21 RX ORDER — TAMSULOSIN HYDROCHLORIDE 0.4 MG/1
CAPSULE ORAL
Qty: 180 CAPSULE | Refills: 0 | Status: SHIPPED | OUTPATIENT
Start: 2025-01-21

## 2025-02-04 DIAGNOSIS — E11.69 TYPE 2 DIABETES MELLITUS WITH OTHER SPECIFIED COMPLICATION, WITHOUT LONG-TERM CURRENT USE OF INSULIN: ICD-10-CM

## 2025-02-04 RX ORDER — INSULIN LISPRO 100 [IU]/ML
10 INJECTION, SOLUTION INTRAVENOUS; SUBCUTANEOUS
Qty: 5 EACH | Refills: 5 | Status: SHIPPED | OUTPATIENT
Start: 2025-02-04

## 2025-02-11 DIAGNOSIS — I10 HYPERTENSION, UNSPECIFIED TYPE: ICD-10-CM

## 2025-02-12 DIAGNOSIS — I10 HYPERTENSION, UNSPECIFIED TYPE: ICD-10-CM

## 2025-02-12 RX ORDER — METOPROLOL SUCCINATE 25 MG/1
25 TABLET, EXTENDED RELEASE ORAL DAILY
Qty: 90 TABLET | Refills: 1 | Status: SHIPPED | OUTPATIENT
Start: 2025-02-12 | End: 2025-08-11

## 2025-02-14 RX ORDER — METOPROLOL SUCCINATE 25 MG/1
25 TABLET, EXTENDED RELEASE ORAL DAILY
Qty: 90 TABLET | Refills: 1 | OUTPATIENT
Start: 2025-02-14

## 2025-02-15 DIAGNOSIS — I10 HYPERTENSION, UNSPECIFIED TYPE: ICD-10-CM

## 2025-02-17 RX ORDER — FOSINOPIRL SODIUM 10 MG/1
10 TABLET ORAL DAILY
Qty: 90 TABLET | Refills: 1 | Status: SHIPPED | OUTPATIENT
Start: 2025-02-17 | End: 2025-08-16

## 2025-02-19 DIAGNOSIS — E78.5 HYPERLIPIDEMIA, UNSPECIFIED HYPERLIPIDEMIA TYPE: ICD-10-CM

## 2025-02-19 DIAGNOSIS — E11.69 TYPE 2 DIABETES MELLITUS WITH OTHER SPECIFIED COMPLICATION, WITHOUT LONG-TERM CURRENT USE OF INSULIN: ICD-10-CM

## 2025-02-25 RX ORDER — METFORMIN HYDROCHLORIDE 500 MG/1
500 TABLET ORAL
Qty: 180 TABLET | Refills: 1 | Status: SHIPPED | OUTPATIENT
Start: 2025-02-25

## 2025-02-25 RX ORDER — ATORVASTATIN CALCIUM 40 MG/1
40 TABLET, FILM COATED ORAL NIGHTLY
Qty: 90 TABLET | Refills: 1 | Status: SHIPPED | OUTPATIENT
Start: 2025-02-25

## 2025-03-04 DIAGNOSIS — E11.9 CONTROLLED TYPE 2 DIABETES MELLITUS WITHOUT COMPLICATION, UNSPECIFIED WHETHER LONG TERM INSULIN USE (MULTI): ICD-10-CM

## 2025-03-05 DIAGNOSIS — E11.9 CONTROLLED TYPE 2 DIABETES MELLITUS WITHOUT COMPLICATION, UNSPECIFIED WHETHER LONG TERM INSULIN USE (MULTI): ICD-10-CM

## 2025-03-06 RX ORDER — BLOOD-GLUCOSE METER
1 EACH MISCELLANEOUS 3 TIMES DAILY
Qty: 300 STRIP | Refills: 1 | OUTPATIENT
Start: 2025-03-06

## 2025-03-06 RX ORDER — BLOOD-GLUCOSE METER
300 EACH MISCELLANEOUS 3 TIMES DAILY
Qty: 300 STRIP | Refills: 1 | Status: SHIPPED | OUTPATIENT
Start: 2025-03-06

## 2025-03-17 ENCOUNTER — TELEPHONE (OUTPATIENT)
Dept: PRIMARY CARE | Facility: CLINIC | Age: 80
End: 2025-03-17
Payer: MEDICARE

## 2025-03-17 NOTE — TELEPHONE ENCOUNTER
Patient needs you to schedule   Metformin 500 mg 1 by mouth 2 daily   Tamsulosin .4 mg (sent to wrong pharmacy)   [facility] [place]         Was not notified his medication was ready and the pharmacy sent the script back     wants to know if you can do the extender release of the metformin instead of quick release      Wants to know if you can take all the pharmacies out that are not [facility] in [place]

## 2025-03-21 DIAGNOSIS — E11.69 TYPE 2 DIABETES MELLITUS WITH OTHER SPECIFIED COMPLICATION, WITHOUT LONG-TERM CURRENT USE OF INSULIN: Primary | ICD-10-CM

## 2025-03-21 DIAGNOSIS — C61 PROSTATE CANCER (MULTI): ICD-10-CM

## 2025-03-21 RX ORDER — TAMSULOSIN HYDROCHLORIDE 0.4 MG/1
CAPSULE ORAL
Qty: 180 CAPSULE | Refills: 1 | Status: SHIPPED | OUTPATIENT
Start: 2025-03-21

## 2025-03-21 RX ORDER — METFORMIN HYDROCHLORIDE 500 MG/1
500 TABLET, EXTENDED RELEASE ORAL
Qty: 180 TABLET | Refills: 1 | Status: SHIPPED | OUTPATIENT
Start: 2025-03-21 | End: 2025-09-17

## 2025-03-29 DIAGNOSIS — E11.9 CONTROLLED TYPE 2 DIABETES MELLITUS WITHOUT COMPLICATION, UNSPECIFIED WHETHER LONG TERM INSULIN USE: ICD-10-CM

## 2025-03-31 DIAGNOSIS — K21.9 GASTROESOPHAGEAL REFLUX DISEASE, UNSPECIFIED WHETHER ESOPHAGITIS PRESENT: ICD-10-CM

## 2025-03-31 DIAGNOSIS — E11.69 TYPE 2 DIABETES MELLITUS WITH OTHER SPECIFIED COMPLICATION, WITHOUT LONG-TERM CURRENT USE OF INSULIN: ICD-10-CM

## 2025-03-31 RX ORDER — INSULIN GLARGINE 100 [IU]/ML
INJECTION, SOLUTION SUBCUTANEOUS
Qty: 30 ML | Refills: 2 | Status: SHIPPED | OUTPATIENT
Start: 2025-03-31

## 2025-03-31 NOTE — TELEPHONE ENCOUNTER
Patient needs refill on Humalog.  The one from February went to [facility] but the patient doesn't go there anymore  Esomeprazole 40mg takes one a day   Pharmacy [facility] [place]   1 pair

## 2025-04-01 RX ORDER — ESOMEPRAZOLE MAGNESIUM 40 MG/1
CAPSULE, DELAYED RELEASE ORAL
Qty: 90 CAPSULE | Refills: 1 | Status: SHIPPED | OUTPATIENT
Start: 2025-04-01

## 2025-04-01 RX ORDER — INSULIN LISPRO 100 [IU]/ML
10 INJECTION, SOLUTION INTRAVENOUS; SUBCUTANEOUS
Qty: 5 EACH | Refills: 5 | Status: SHIPPED | OUTPATIENT
Start: 2025-04-01 | End: 2025-04-04

## 2025-04-03 DIAGNOSIS — E11.69 TYPE 2 DIABETES MELLITUS WITH OTHER SPECIFIED COMPLICATION, WITHOUT LONG-TERM CURRENT USE OF INSULIN: ICD-10-CM

## 2025-04-04 RX ORDER — INSULIN LISPRO 100 U/ML
INJECTION, SOLUTION SUBCUTANEOUS
Qty: 15 ML | Refills: 5 | Status: SHIPPED | OUTPATIENT
Start: 2025-04-04

## 2025-04-24 DIAGNOSIS — I10 HYPERTENSION, UNSPECIFIED TYPE: ICD-10-CM

## 2025-04-24 NOTE — TELEPHONE ENCOUNTER
Patient needs refill   Fosinopril 10 mg   [facility] [place]      Can you take [facility] out of chart insurance no longer covers this plan

## 2025-04-25 DIAGNOSIS — I10 HYPERTENSION, UNSPECIFIED TYPE: ICD-10-CM

## 2025-04-25 RX ORDER — FOSINOPIRL SODIUM 10 MG/1
10 TABLET ORAL DAILY
Qty: 90 TABLET | Refills: 1 | Status: SHIPPED | OUTPATIENT
Start: 2025-04-25 | End: 2025-10-22

## 2025-04-26 ASSESSMENT — ENCOUNTER SYMPTOMS
DIARRHEA: 0
ANOREXIA: 0
COUGH: 1
RHINORRHEA: 0
SHORTNESS OF BREATH: 1
NAIL CHANGES: 1
SORE THROAT: 0
FEVER: 0
FATIGUE: 0
VOMITING: 0
EYE PAIN: 0

## 2025-04-28 RX ORDER — FOSINOPIRL SODIUM 10 MG/1
10 TABLET ORAL DAILY
Qty: 90 TABLET | Refills: 1 | OUTPATIENT
Start: 2025-04-28 | End: 2025-10-25

## 2025-05-01 ENCOUNTER — APPOINTMENT (OUTPATIENT)
Dept: ENDOCRINOLOGY | Facility: CLINIC | Age: 80
End: 2025-05-01
Payer: COMMERCIAL

## 2025-05-01 VITALS — BODY MASS INDEX: 30.11 KG/M2 | WEIGHT: 198 LBS | DIASTOLIC BLOOD PRESSURE: 90 MMHG | SYSTOLIC BLOOD PRESSURE: 140 MMHG

## 2025-05-01 DIAGNOSIS — I10 BENIGN ESSENTIAL HYPERTENSION: ICD-10-CM

## 2025-05-01 DIAGNOSIS — E78.5 HYPERLIPIDEMIA, UNSPECIFIED HYPERLIPIDEMIA TYPE: ICD-10-CM

## 2025-05-01 DIAGNOSIS — E11.69 TYPE 2 DIABETES MELLITUS WITH OTHER SPECIFIED COMPLICATION, WITHOUT LONG-TERM CURRENT USE OF INSULIN: Primary | ICD-10-CM

## 2025-05-01 LAB — POC HEMOGLOBIN A1C: 8.2 % (ref 4.2–6.5)

## 2025-05-01 PROCEDURE — 3080F DIAST BP >= 90 MM HG: CPT | Performed by: INTERNAL MEDICINE

## 2025-05-01 PROCEDURE — 99214 OFFICE O/P EST MOD 30 MIN: CPT | Performed by: INTERNAL MEDICINE

## 2025-05-01 PROCEDURE — 3077F SYST BP >= 140 MM HG: CPT | Performed by: INTERNAL MEDICINE

## 2025-05-01 PROCEDURE — 83036 HEMOGLOBIN GLYCOSYLATED A1C: CPT | Performed by: INTERNAL MEDICINE

## 2025-05-01 NOTE — PROGRESS NOTES
Patient ID: Eleno Glaser is a 79 y.o. male who presents for Follow-up.  HPI  The patient comes in for follow up.    He has type 2 diabetes on insulin uncontrolled complicated by neuropathy and mild renal insufficiency.    He continues on metformin  mg twice daily Lantus 20 units Humalog 12 units with each meal plus sliding scale.    At 1 point he was on Trulicity but it was cost prohibitive.    Since last being seen he had cataract surgery and had new dentition.    He has been checking blood sugars 3 or 4 times per day varying the times but there is no clear-cut pattern to his numbers with some numbers under good control and some numbers significantly higher.    He has had no severe hypoglycemia.    He states his diet and activity level is pretty stable.    Physically he has no other complaints.    ROS  Comprehensive review of systems is negative.    Objective   Physical Exam  Visit Vitals  /90      Vitals:    05/01/25 1158   Weight: 89.8 kg (198 lb)      Body mass index is 30.11 kg/m².      Weight 198 down 6 pounds    ENT normal. No adenopathy  Fundi normal  Thyroid palpable and normal. No nodules  Chest clear to auscultation  Heart sounds are normal  Abdomen nontender. Bowel sounds normal. No organomegaly  Feet are okay  Decreased vibration and monofilament sensation normal pulses, no lesions    Current Medications[1]    Assessment/Plan     1.  Type 2 diabetes on insulin uncontrolled  2.  Hypertension  3.  Hyperlipidemia    Will check hemoglobin A1c by fingerstick today.    We discussed variables are going to blood sugar control.    We discussed consistency with diet and looking for patterns.    If his hemoglobin A1c is above 9 we will look at increasing the Lantus to 22 units and the Humalog to 13 units with each meal.    He will watch for hypoglycemia.    He will follow-up with me in 4 months sooner as needed.             [1]   Current Outpatient Medications   Medication Sig Dispense Refill     Admelog SoloStar U-100 Insulin 100 unit/mL pen INJECT 10 UNITS UNDER THE SKIN 3 TIMES DAILY (MORNING, MIDDAY AND LATE AFTERNOON) TAKE AS DIRECTED PER INSULIN INSTRUCTIONS 15 mL 5    alpha lipoic acid 600 mg capsule Take 1 capsule by mouth once daily.      aspirin 81 mg EC tablet Take 1 tablet (81 mg) by mouth once daily.      aspirin-acetaminophen-caffeine (Excedrin Migraine) 250-250-65 mg tablet Take 1 tablet by mouth once daily.      atorvastatin (Lipitor) 40 mg tablet Take 1 tablet (40 mg) by mouth once daily at bedtime. 90 tablet 1    blood sugar diagnostic (OneTouch Verio test strips) strip Inject 300 strips under the skin 3 times a day. 300 strip 1    esomeprazole (NexIUM) 40 mg DR capsule TAKE 1 CAPSULE BY MOUTH DAILY  BEFORE BREAKFAST 90 capsule 1    ferrous sulfate 325 (65 Fe) MG tablet Take 1 tablet (325 mg) by mouth once daily.      fluticasone (Flonase Allergy Relief) 50 mcg/actuation nasal spray Administer 2 sprays into affected nostril(s) once daily.      fosinopril (Monopril) 10 mg tablet Take 1 tablet (10 mg) by mouth once daily. 90 tablet 1    hydrOXYzine HCL (Atarax) 25 mg tablet Take 1 tablet (25 mg) by mouth 3 times a day as needed for anxiety or itching. 270 tablet 1    ibuprofen 800 mg tablet Take 1 tablet (800 mg) by mouth every 8 hours if needed for mild pain (1 - 3). (Patient not taking: Reported on 1/9/2025)      Lantus Solostar U-100 Insulin 100 unit/mL (3 mL) pen INJECT SUBCUTANEOUSLY 18 UNITS  ONCE DAILY AT BEDTIME 30 mL 2    metFORMIN XR (Glucophage-XR) 500 mg 24 hr tablet Take 1 tablet (500 mg) by mouth 2 times daily (morning and late afternoon). Do not crush, chew, or split. 180 tablet 1    metoprolol succinate XL (Toprol-XL) 25 mg 24 hr tablet Take 1 tablet (25 mg) by mouth once daily. 90 tablet 1    Myrbetriq 50 mg tablet extended release 24 hr 24 hr tablet Take 1 tablet (50 mg) by mouth once daily. (Patient not taking: Reported on 1/9/2025)      pen needle, diabetic 31 gauge x  "5/16\" needle To be used 4 times daily with insulin shots 200 each 0    tamsulosin (Flomax) 0.4 mg 24 hr capsule TAKE 1 CAPSULE BY MOUTH IN THE  MORNING AND 1 CAPSULE BY MOUTH  AT BEDTIME 180 capsule 1     No current facility-administered medications for this visit.     "

## 2025-05-02 ENCOUNTER — APPOINTMENT (OUTPATIENT)
Facility: CLINIC | Age: 80
End: 2025-05-02
Payer: MEDICARE

## 2025-05-02 VITALS
OXYGEN SATURATION: 94 % | DIASTOLIC BLOOD PRESSURE: 108 MMHG | WEIGHT: 200 LBS | SYSTOLIC BLOOD PRESSURE: 184 MMHG | HEIGHT: 68 IN | HEART RATE: 66 BPM | BODY MASS INDEX: 30.31 KG/M2

## 2025-05-02 DIAGNOSIS — I10 BENIGN ESSENTIAL HYPERTENSION: ICD-10-CM

## 2025-05-02 DIAGNOSIS — Z13.29 SCREENING FOR THYROID DISORDER: ICD-10-CM

## 2025-05-02 DIAGNOSIS — E11.65 TYPE 2 DIABETES MELLITUS WITH HYPERGLYCEMIA, WITHOUT LONG-TERM CURRENT USE OF INSULIN: ICD-10-CM

## 2025-05-02 DIAGNOSIS — R23.2 HOT FLASH IN MALE: ICD-10-CM

## 2025-05-02 DIAGNOSIS — K22.70 BARRETT'S ESOPHAGUS WITHOUT DYSPLASIA: ICD-10-CM

## 2025-05-02 DIAGNOSIS — Z12.5 PROSTATE CANCER SCREENING: ICD-10-CM

## 2025-05-02 DIAGNOSIS — M48.02 DEGENERATIVE CERVICAL SPINAL STENOSIS: ICD-10-CM

## 2025-05-02 DIAGNOSIS — K21.9 GASTROESOPHAGEAL REFLUX DISEASE WITHOUT ESOPHAGITIS: ICD-10-CM

## 2025-05-02 DIAGNOSIS — L57.0 ACTINIC KERATOSIS: ICD-10-CM

## 2025-05-02 DIAGNOSIS — R06.02 SOB (SHORTNESS OF BREATH): Primary | ICD-10-CM

## 2025-05-02 DIAGNOSIS — E78.5 HYPERLIPIDEMIA, UNSPECIFIED HYPERLIPIDEMIA TYPE: ICD-10-CM

## 2025-05-02 DIAGNOSIS — I10 HYPERTENSION, UNSPECIFIED TYPE: ICD-10-CM

## 2025-05-02 NOTE — ASSESSMENT & PLAN NOTE
The patient's history and exam are consistent with actinic keratoses of the forehead. The patient was counseled regarding the nature of these precancerous lesions and the option of cryotherapy for treatment. A referral to dermatology has been placed in the system for further evaluation and management. I also encouraged the patient to use sunscreen when going outside as well.

## 2025-05-02 NOTE — ASSESSMENT & PLAN NOTE
Signs and symptoms are concerning for a cardiac etiology of his dyspnea. He reports being unable to walk for long periods before becoming short of breath. 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 The patient was previously seen by Dr. Herr, and it would be reasonable for him to reestablish care with cardiology to further evaluate the need for another echocardiogram.

## 2025-05-02 NOTE — PROGRESS NOTES
Chief Complaint  Patient ID: Eleno Glaser is a 79 y.o. male who presents for Pain (- bumps, itch, painful bumps on forehead//), Shortness of Breath, and Night Sweats.         Reviewed all medications by prescribing practitioner or clinical pharmacist (such as prescriptions, OTCs, herbal therapies and supplements) and documented in the medical record.    Care Team:  Patient Care Team:  Shira Schafer DO as PCP - General (Family Medicine)  Shira Schafer DO as PCP - [employer] PCP    History of Present Illness  Bumps on forehead  The patient reports that the bumps itch and hurt, which can sometimes lead to headaches. The symptoms have been on and off for a while. The patient does not wear sunscreen and tends to scratch the bumps when they are dry, causing them to scab over. No redness or spreading of the bumps has been noted.    2. SOB  The patient reports that the issue has been ongoing for over six years. He walks one mile every night but does not engage in much other exercise. He has been experiencing persistent coughing during this time. The patient has not had an echocardiogram since 2022, but previous results showed a normal ejection fraction with impaired relaxation. Patient becomes short of breath with minimal exertion now.     3. Hot flashes  Going on for a few months  Spontaneously gets sweaty and hot when others are comfortable.     4. Nausea  The patient reports experiencing symptoms for the past 3-4 months. He has a history of Rojas's esophagus, GERD, and takes esomeprazole, which generally controls his symptoms well. However, he occasionally experiences nausea before meals, during meals, and even after eating. His last EGD was performed in 2021 by Dr. Dawn, who recommended a repeat EGD in 2-3 years.    Review of Systems  All pertinent positive symptoms are included in the history of present illness.    All other systems have been reviewed and are negative and noncontributory to  "this patient's current ailments.    Past Medical History  He has a past medical history of Personal history of malignant neoplasm of prostate (01/29/2020), Personal history of other diseases of the circulatory system, Personal history of other diseases of the musculoskeletal system and connective tissue, Personal history of other diseases of the nervous system and sense organs, and Personal history of other endocrine, nutritional and metabolic disease.    Surgical History  He has a past surgical history that includes Other surgical history (07/31/2019); Other surgical history (01/29/2020); and Other surgical history (01/29/2020).     Social History  He reports that he has never smoked. He has never used smokeless tobacco. He reports that he does not drink alcohol and does not use drugs.    Family History[1]  Medications Prior to Visit[2]    Allergies  Patient has no known allergies.    Immunization History   Administered Date(s) Administered    COVID-19, mRNA, LNP-S, PF, 30 mcg/0.3 mL dose 03/11/2021, 04/08/2021    Flu vaccine, quadrivalent, high-dose, preservative free, age 65y+ (FLUZONE) 10/07/2020, 12/16/2021, 11/08/2023    Flu vaccine, trivalent, preservative free, HIGH-DOSE, age 65y+ (Fluzone) 10/03/2014, 10/21/2016, 09/09/2017, 11/12/2019    Influenza, Unspecified 10/01/2012    Influenza, trivalent, adjuvanted 10/16/2018    Novel influenza-H1N1-09, preservative-free 11/05/2009    Pfizer COVID-19 vaccine, bivalent, age 12 years and older (30 mcg/0.3 mL) 11/03/2022    Pfizer Gray Cap SARS-CoV-2 03/07/2022    Pneumococcal conjugate vaccine, 13-valent (PREVNAR 13) 05/15/2015    Pneumococcal polysaccharide vaccine, 23-valent, age 2 years and older (PNEUMOVAX 23) 12/10/2009, 10/21/2016    Tdap vaccine, age 7 year and older (BOOSTRIX, ADACEL) 12/10/2009    Zoster, live 03/10/2013     Objective   Visit Vitals  BP (!) 184/108   Pulse 66   Ht 1.727 m (5' 8\")   Wt 90.7 kg (200 lb)   SpO2 94%   BMI 30.41 kg/m²   Smoking " Status Never   BSA 2.09 m²        BP Readings from Last 3 Encounters:   05/02/25 (!) 184/108   05/01/25 140/90   01/09/25 144/80      Wt Readings from Last 3 Encounters:   05/02/25 90.7 kg (200 lb)   05/01/25 89.8 kg (198 lb)   01/09/25 93 kg (205 lb)      Relevant Results  Office Visit on 05/01/2025   Component Date Value    POC HEMOGLOBIN A1c 05/01/2025 8.2 (A)      The 10-year ASCVD risk score (Liliam PAZ, et al., 2019) is: 78.5%    Values used to calculate the score:      Age: 79 years      Sex: Male      Is Non- : No      Diabetic: Yes      Tobacco smoker: No      Systolic Blood Pressure: 184 mmHg      Is BP treated: Yes      HDL Cholesterol: 40.3 mg/dL      Total Cholesterol: 165 mg/dL    Physical Exam  CONSTITUTIONAL - well nourished, well developed, looks like stated age, in no acute distress, not ill-appearing, and not tired appearing  SKIN - numerous dry scaly papules noted on forehead, some excoriated, about 2-3 mm in size. Located center of forehead.   HEAD - no trauma, normocephalic  CHEST - clear to auscultation, no wheezing, no crackles and no rales, good effort  CARDIAC - regular rate and regular rhythm, no skipped beats, no murmur  ABDOMEN - no organomegaly, soft, nontender, nondistended, normal bowel sounds  EXTREMITIES - no obvious or evident edema, no obvious or evident deformities  NEUROLOGICAL - alert, oriented and no focal signs  PSYCHIATRIC - alert, pleasant and cordial, age-appropriate    Assessment and Plan  Eleno is a 79 year old male who presents with multiple complaints. The patient will follow up in 1 month for his Medicare Wellness visit and a BP check.     Problem List Items Addressed This Visit       Rojas's esophagus    Patient is due for his EGD.  Order placed in the system.         Relevant Orders    Esophagogastroduodenoscopy (EGD)    Hypertension    The patient's blood pressure was significantly elevated in the office today on intake; however, he was  asymptomatic. We will continue to monitor his blood pressure. The patient is scheduled to follow up in one month for his Medicare wellness exam, at which time we will recheck his blood pressure and make any necessary adjustments to his medication.         Degenerative cervical spinal stenosis    Relevant Orders    Disability Placard    Hyperlipidemia    Relevant Orders    Comprehensive Metabolic Panel    Lipid Panel    SOB (shortness of breath) - Primary    Signs and symptoms are concerning for a cardiac etiology of his dyspnea. He reports being unable to walk for long periods before becoming short of breath. He has a history of impaired relaxation of the left ventricle and nonobstructive CAD s/p C. The patient was previously seen by Dr. Herr, and it would be reasonable for him to reestablish care with cardiology to further evaluate the need for another echocardiogram.         Relevant Orders    Referral to Cardiology    Gastroesophageal reflux disease without esophagitis    Prior to starting any new medications we will obtain an EGD which patient is due for.  We will follow-up with results and further recommendations.         Actinic keratosis    The patient's history and exam are consistent with actinic keratoses of the forehead. The patient was counseled regarding the nature of these precancerous lesions and the option of cryotherapy for treatment. A referral to dermatology has been placed in the system for further evaluation and management. I also encouraged the patient to use sunscreen when going outside as well.          Relevant Orders    Referral to Dermatology    Hot flash in male    We will assess with basic lab work initially and follow up with results.           Other Visit Diagnoses         Type 2 diabetes mellitus with hyperglycemia, without long-term current use of insulin          BMI 30.0-30.9,adult        Relevant Orders    CBC and Auto Differential      Screening for thyroid disorder         "Relevant Orders    TSH with reflex to Free T4 if abnormal      Prostate cancer screening        Relevant Orders    Prostate Specific Antigen               [1] No family history on file.  [2]   Outpatient Medications Prior to Visit   Medication Sig Dispense Refill    Admelog SoloStar U-100 Insulin 100 unit/mL pen INJECT 10 UNITS UNDER THE SKIN 3 TIMES DAILY (MORNING, MIDDAY AND LATE AFTERNOON) TAKE AS DIRECTED PER INSULIN INSTRUCTIONS 15 mL 5    alpha lipoic acid 600 mg capsule Take 1 capsule by mouth once daily.      aspirin 81 mg EC tablet Take 1 tablet (81 mg) by mouth once daily.      aspirin-acetaminophen-caffeine (Excedrin Migraine) 250-250-65 mg tablet Take 1 tablet by mouth once daily.      atorvastatin (Lipitor) 40 mg tablet Take 1 tablet (40 mg) by mouth once daily at bedtime. 90 tablet 1    blood sugar diagnostic (OneTouch Verio test strips) strip Inject 300 strips under the skin 3 times a day. 300 strip 1    esomeprazole (NexIUM) 40 mg DR capsule TAKE 1 CAPSULE BY MOUTH DAILY  BEFORE BREAKFAST 90 capsule 1    fosinopril (Monopril) 10 mg tablet Take 1 tablet (10 mg) by mouth once daily. 90 tablet 1    hydrOXYzine HCL (Atarax) 25 mg tablet Take 1 tablet (25 mg) by mouth 3 times a day as needed for anxiety or itching. 270 tablet 1    Lantus Solostar U-100 Insulin 100 unit/mL (3 mL) pen INJECT SUBCUTANEOUSLY 18 UNITS  ONCE DAILY AT BEDTIME 30 mL 2    metFORMIN XR (Glucophage-XR) 500 mg 24 hr tablet Take 1 tablet (500 mg) by mouth 2 times daily (morning and late afternoon). Do not crush, chew, or split. 180 tablet 1    metoprolol succinate XL (Toprol-XL) 25 mg 24 hr tablet Take 1 tablet (25 mg) by mouth once daily. 90 tablet 1    pen needle, diabetic 31 gauge x 5/16\" needle To be used 4 times daily with insulin shots 200 each 0    tamsulosin (Flomax) 0.4 mg 24 hr capsule TAKE 1 CAPSULE BY MOUTH IN THE  MORNING AND 1 CAPSULE BY MOUTH  AT BEDTIME 180 capsule 1    ferrous sulfate 325 (65 Fe) MG tablet Take 1 " tablet (325 mg) by mouth once daily.      fluticasone (Flonase Allergy Relief) 50 mcg/actuation nasal spray Administer 2 sprays into affected nostril(s) once daily.      ibuprofen 800 mg tablet Take 1 tablet (800 mg) by mouth every 8 hours if needed for mild pain (1 - 3). (Patient not taking: Reported on 1/9/2025)      Myrbetriq 50 mg tablet extended release 24 hr 24 hr tablet Take 1 tablet (50 mg) by mouth once daily. (Patient not taking: Reported on 1/9/2025)       No facility-administered medications prior to visit.

## 2025-05-02 NOTE — ASSESSMENT & PLAN NOTE
The patient's blood pressure was significantly elevated in the office today on intake; however, he was asymptomatic. We will continue to monitor his blood pressure. The patient is scheduled to follow up in one month for his Medicare wellness exam, at which time we will recheck his blood pressure and make any necessary adjustments to his medication.

## 2025-05-02 NOTE — ASSESSMENT & PLAN NOTE
Prior to starting any new medications we will obtain an EGD which patient is due for.  We will follow-up with results and further recommendations.

## 2025-05-10 LAB
ALBUMIN SERPL-MCNC: 4.2 G/DL (ref 3.6–5.1)
ALP SERPL-CCNC: 63 U/L (ref 35–144)
ALT SERPL-CCNC: 22 U/L (ref 9–46)
ANION GAP SERPL CALCULATED.4IONS-SCNC: 17 MMOL/L (CALC) (ref 7–17)
AST SERPL-CCNC: 19 U/L (ref 10–35)
BASOPHILS # BLD AUTO: 52 CELLS/UL (ref 0–200)
BASOPHILS NFR BLD AUTO: 1.1 %
BILIRUB SERPL-MCNC: 0.7 MG/DL (ref 0.2–1.2)
BUN SERPL-MCNC: 17 MG/DL (ref 7–25)
CALCIUM SERPL-MCNC: 9.1 MG/DL (ref 8.6–10.3)
CHLORIDE SERPL-SCNC: 104 MMOL/L (ref 98–110)
CHOLEST SERPL-MCNC: 156 MG/DL
CHOLEST/HDLC SERPL: 3.5 (CALC)
CO2 SERPL-SCNC: 18 MMOL/L (ref 20–32)
CREAT SERPL-MCNC: 0.97 MG/DL (ref 0.7–1.28)
EGFRCR SERPLBLD CKD-EPI 2021: 80 ML/MIN/1.73M2
EOSINOPHIL # BLD AUTO: 240 CELLS/UL (ref 15–500)
EOSINOPHIL NFR BLD AUTO: 5.1 %
ERYTHROCYTE [DISTWIDTH] IN BLOOD BY AUTOMATED COUNT: 13.3 % (ref 11–15)
GLUCOSE SERPL-MCNC: 159 MG/DL (ref 65–99)
HCT VFR BLD AUTO: 43.9 % (ref 38.5–50)
HDLC SERPL-MCNC: 45 MG/DL
HGB BLD-MCNC: 14.3 G/DL (ref 13.2–17.1)
LDLC SERPL CALC-MCNC: 83 MG/DL (CALC)
LYMPHOCYTES # BLD AUTO: 926 CELLS/UL (ref 850–3900)
LYMPHOCYTES NFR BLD AUTO: 19.7 %
MCH RBC QN AUTO: 30 PG (ref 27–33)
MCHC RBC AUTO-ENTMCNC: 32.6 G/DL (ref 32–36)
MCV RBC AUTO: 92 FL (ref 80–100)
MONOCYTES # BLD AUTO: 456 CELLS/UL (ref 200–950)
MONOCYTES NFR BLD AUTO: 9.7 %
NEUTROPHILS # BLD AUTO: 3027 CELLS/UL (ref 1500–7800)
NEUTROPHILS NFR BLD AUTO: 64.4 %
NONHDLC SERPL-MCNC: 111 MG/DL (CALC)
PLATELET # BLD AUTO: 300 THOUSAND/UL (ref 140–400)
PMV BLD REES-ECKER: 10 FL (ref 7.5–12.5)
POTASSIUM SERPL-SCNC: 4.7 MMOL/L (ref 3.5–5.3)
PROT SERPL-MCNC: 6.3 G/DL (ref 6.1–8.1)
PSA SERPL-MCNC: <0.04 NG/ML
RBC # BLD AUTO: 4.77 MILLION/UL (ref 4.2–5.8)
SODIUM SERPL-SCNC: 139 MMOL/L (ref 135–146)
TRIGL SERPL-MCNC: 183 MG/DL
TSH SERPL-ACNC: 3.04 MIU/L (ref 0.4–4.5)
WBC # BLD AUTO: 4.7 THOUSAND/UL (ref 3.8–10.8)

## 2025-05-12 DIAGNOSIS — E11.9 CONTROLLED TYPE 2 DIABETES MELLITUS WITHOUT COMPLICATION, UNSPECIFIED WHETHER LONG TERM INSULIN USE: ICD-10-CM

## 2025-05-12 NOTE — TELEPHONE ENCOUNTER
Needs refills Lantus Solostar U-100 insulin 100 unit (3ml) pen  20 units a bedtime  Onetouch Verio Test strips  #400 tests 4 times a day   Pharmacy [facility] [place]    Also he is set to have an EGD done and with the prep was told to check on his meds.  He is diabetic and on insulin.

## 2025-05-16 DIAGNOSIS — E11.9 CONTROLLED TYPE 2 DIABETES MELLITUS WITHOUT COMPLICATION, UNSPECIFIED WHETHER LONG TERM INSULIN USE: ICD-10-CM

## 2025-05-16 RX ORDER — BLOOD-GLUCOSE METER
300 EACH MISCELLANEOUS 4 TIMES DAILY
Qty: 400 STRIP | Refills: 1 | Status: SHIPPED | OUTPATIENT
Start: 2025-05-16 | End: 2025-05-16 | Stop reason: SDUPTHER

## 2025-05-16 RX ORDER — BLOOD-GLUCOSE METER
EACH MISCELLANEOUS
Qty: 400 STRIP | Refills: 1 | Status: SHIPPED | OUTPATIENT
Start: 2025-05-16

## 2025-05-16 RX ORDER — INSULIN GLARGINE 100 [IU]/ML
20 INJECTION, SOLUTION SUBCUTANEOUS NIGHTLY
Qty: 30 ML | Refills: 2 | Status: SHIPPED | OUTPATIENT
Start: 2025-05-16

## 2025-05-16 NOTE — TELEPHONE ENCOUNTER
[facility] requests for a refill of:  ALPRAZolam (XANAX) 0.25 MG tablet TAKE 1 TABLET BY MOUTH THREE TIMES A DAY AS NEEDED FOR ANXIETY last filled:  12/29/2020 HELEN PIERCE 90 tablet 0 ordered   Last visit/[facility]: 10/29/20  No pending appointment    Okay to fill?  Please review and address refill request.   Test strips need redone

## 2025-06-04 ENCOUNTER — APPOINTMENT (OUTPATIENT)
Dept: CARDIOLOGY | Facility: HOSPITAL | Age: 80
End: 2025-06-04
Payer: MEDICARE

## 2025-06-04 VITALS
HEART RATE: 104 BPM | BODY MASS INDEX: 30.4 KG/M2 | WEIGHT: 199.96 LBS | SYSTOLIC BLOOD PRESSURE: 174 MMHG | OXYGEN SATURATION: 94 % | DIASTOLIC BLOOD PRESSURE: 86 MMHG

## 2025-06-04 DIAGNOSIS — I10 HYPERTENSION, UNSPECIFIED TYPE: Primary | ICD-10-CM

## 2025-06-04 DIAGNOSIS — R06.02 SOB (SHORTNESS OF BREATH): ICD-10-CM

## 2025-06-04 DIAGNOSIS — E78.5 HYPERLIPIDEMIA, UNSPECIFIED HYPERLIPIDEMIA TYPE: ICD-10-CM

## 2025-06-04 DIAGNOSIS — R00.2 HEART PALPITATIONS: ICD-10-CM

## 2025-06-04 LAB
ATRIAL RATE: 104 BPM
P AXIS: 44 DEGREES
P OFFSET: 199 MS
P ONSET: 149 MS
PR INTERVAL: 126 MS
Q ONSET: 212 MS
QRS COUNT: 17 BEATS
QRS DURATION: 84 MS
QT INTERVAL: 344 MS
QTC CALCULATION(BAZETT): 452 MS
QTC FREDERICIA: 413 MS
R AXIS: -84 DEGREES
T AXIS: 43 DEGREES
T OFFSET: 384 MS
VENTRICULAR RATE: 104 BPM

## 2025-06-04 PROCEDURE — 3077F SYST BP >= 140 MM HG: CPT | Performed by: NURSE PRACTITIONER

## 2025-06-04 PROCEDURE — 3079F DIAST BP 80-89 MM HG: CPT | Performed by: NURSE PRACTITIONER

## 2025-06-04 PROCEDURE — 1159F MED LIST DOCD IN RCRD: CPT | Performed by: NURSE PRACTITIONER

## 2025-06-04 PROCEDURE — 93005 ELECTROCARDIOGRAM TRACING: CPT | Performed by: NURSE PRACTITIONER

## 2025-06-04 PROCEDURE — 99204 OFFICE O/P NEW MOD 45 MIN: CPT | Performed by: NURSE PRACTITIONER

## 2025-06-04 ASSESSMENT — ENCOUNTER SYMPTOMS
MYALGIAS: 1
PSYCHIATRIC NEGATIVE: 1
ENDOCRINE NEGATIVE: 1
EYES NEGATIVE: 1
CONSTITUTIONAL NEGATIVE: 1
HEADACHES: 1
SHORTNESS OF BREATH: 1
HEMATOLOGIC/LYMPHATIC NEGATIVE: 1
DYSPNEA ON EXERTION: 1
GASTROINTESTINAL NEGATIVE: 1

## 2025-06-04 NOTE — PATIENT INSTRUCTIONS
CALL WITH ANY QUESTIONS   HEART MONITOR FOR TWO WEEKS   WILL CALL TO REVIEW THE RESULTS   ECHOCARDIOGRAM   CT SCAN OF THE LUNGS

## 2025-06-04 NOTE — PROGRESS NOTES
Referred by Dr. Schafer for New Patient Visit (SOB)     History Of Present Illness:    Dear Dr. Schafer,     I had the pleasure of meeting Mr. Glaser today at [facility] for shortness of breath. The patient is seen in collaboration with Dr. Herr. Mr. Glaser is a very pleasant 79 year old gentleman with a history of DM, HTN, HLD and BPH, denies history of smoking. He complains of exertional shortness of breath. States he has been exercising. Denies chest pain or heart palpitations. He complains of headaches, nausea and indigestion. Currently following up with his primary care doctor for a rash.        Review of Systems   Constitutional: Negative.   HENT: Negative.     Eyes: Negative.    Cardiovascular:  Positive for dyspnea on exertion.   Respiratory:  Positive for shortness of breath.    Endocrine: Negative.    Hematologic/Lymphatic: Negative.    Skin: Negative.    Musculoskeletal:  Positive for myalgias.   Gastrointestinal: Negative.    Neurological:  Positive for headaches.   Psychiatric/Behavioral: Negative.          Past Medical History:  He has a past medical history of Personal history of malignant neoplasm of prostate (01/29/2020), Personal history of other diseases of the circulatory system, Personal history of other diseases of the musculoskeletal system and connective tissue, Personal history of other diseases of the nervous system and sense organs, and Personal history of other endocrine, nutritional and metabolic disease.    Past Surgical History:  He has a past surgical history that includes Other surgical history (07/31/2019); Other surgical history (01/29/2020); and Other surgical history (01/29/2020).      Social History:  He reports that he has never smoked. He has never used smokeless tobacco. He reports that he does not drink alcohol and does not use drugs.    Family History:  Family History[1]     Allergies:  Patient has no known allergies.    Outpatient  "Medications:  Current Outpatient Medications   Medication Instructions    Admelog SoloStar U-100 Insulin 100 unit/mL pen INJECT 10 UNITS UNDER THE SKIN 3 TIMES DAILY (MORNING, MIDDAY AND LATE AFTERNOON) TAKE AS DIRECTED PER INSULIN INSTRUCTIONS    alpha lipoic acid 600 mg capsule 1 capsule, Daily    aspirin 81 mg EC tablet 1 tablet, Daily    aspirin-acetaminophen-caffeine (Excedrin Migraine) 250-250-65 mg tablet 1 tablet, Daily    atorvastatin (LIPITOR) 40 mg, oral, Nightly    blood sugar diagnostic (OneTouch Verio test strips) As directed    esomeprazole (NexIUM) 40 mg DR capsule TAKE 1 CAPSULE BY MOUTH DAILY  BEFORE BREAKFAST    fosinopril (MONOPRIL) 10 mg, oral, Daily    hydrOXYzine HCL (ATARAX) 25 mg, oral, 3 times daily PRN    Lantus Solostar U-100 Insulin 20 Units, subcutaneous, Nightly, Take as directed per insulin instructions.    metFORMIN XR (GLUCOPHAGE-XR) 500 mg, oral, 2 times daily (morning and late afternoon), Do not crush, chew, or split.    metoprolol succinate XL (TOPROL-XL) 25 mg, oral, Daily    pen needle, diabetic 31 gauge x 5/16\" needle To be used 4 times daily with insulin shots    tamsulosin (Flomax) 0.4 mg 24 hr capsule TAKE 1 CAPSULE BY MOUTH IN THE  MORNING AND 1 CAPSULE BY MOUTH  AT BEDTIME        Last Recorded Vitals:  Vitals:    06/04/25 1514   BP: 174/86   Pulse: 104   SpO2: 94%   Weight: 90.7 kg (199 lb 15.3 oz)       Physical Exam:  Physical Exam  Vitals reviewed.   HENT:      Head: Normocephalic.      Nose: Nose normal.   Eyes:      Pupils: Pupils are equal, round, and reactive to light.   Cardiovascular:      Rate and Rhythm: Normal rate and regular rhythm.   Pulmonary:      Effort: Pulmonary effort is normal.      Breath sounds: Normal breath sounds.   Abdominal:      General: Abdomen is flat.      Palpations: Abdomen is soft.   Musculoskeletal:         General: Normal range of motion.      Cervical back: Normal range of motion.   Skin:     General: Skin is warm and dry. "   Neurological:      General: No focal deficit present.      Mental Status: He is alert and oriented to person, place, and time.   Psychiatric:         Mood and Affect: Mood normal.          Last Labs:  CBC -  Lab Results   Component Value Date    WBC 4.7 05/09/2025    HGB 14.3 05/09/2025    HCT 43.9 05/09/2025    MCV 92.0 05/09/2025     05/09/2025       CMP -  Lab Results   Component Value Date    CALCIUM 9.1 05/09/2025    PHOS 2.9 02/08/2021    PROT 6.3 05/09/2025    ALBUMIN 4.2 05/09/2025    AST 19 05/09/2025    ALT 22 05/09/2025    ALKPHOS 63 05/09/2025    BILITOT 0.7 05/09/2025       LIPID PANEL -   Lab Results   Component Value Date    CHOL 156 05/09/2025    TRIG 183 (H) 05/09/2025    HDL 45 05/09/2025    CHHDL 3.5 05/09/2025    LDLF 62 06/16/2023    VLDL 35 09/19/2024    NHDL 111 05/09/2025       RENAL FUNCTION PANEL -   Lab Results   Component Value Date    GLUCOSE 159 (H) 05/09/2025     05/09/2025    K 4.7 05/09/2025     05/09/2025    CO2 18 (L) 05/09/2025    ANIONGAP 17 05/09/2025    BUN 17 05/09/2025    CREATININE 0.97 05/09/2025    GFRMALE 62 06/16/2023    CALCIUM 9.1 05/09/2025    PHOS 2.9 02/08/2021    ALBUMIN 4.2 05/09/2025        Lab Results   Component Value Date     (H) 02/08/2021    HGBA1C 8.2 (A) 05/01/2025       Last Cardiology Tests:  ECG:  EKG independently reviewed from today showed sinus tachycardia heart rate 104 bpm   Echo:  ECHO: 2/9/2021  1. The left ventricular systolic function is normal with a 60-65% estimated  ejection fraction.  2. Spectral Doppler shows an impaired relaxation pattern of left ventricular  diastolic filling.  3. There is mild mitral and tricuspid regurgitation.  Ejection Fractions:  LVEF 60-65%  Cath:  [facility]: 2/9/2021  Coronary Lesion Summary:  Vessel Stenosis Vessel Segment  LAD 10 to 30% stenosis mid  Circumflex 50% stenosis mid to distal  RCA 30% stenosis distal     Stress Test:    Cardiac Imaging:      Assessment/Plan   Very pleasant 80  year old male with a history of Rojas's esophagus, T2DM, HTN and HLD, [facility] (2021) showed nonobstructive CAD treated with medication management. He complains of exertional dyspnea, he will have an echocardiogram to assess his LV function. EKG today shows sinus tachycardia, he will have a heart monitor to assess for an arrhythmia.  Heart rate and blood pressure are well controlled today.      Plan   -call with any questions   -echocardiogram   -heart monitor for two weeks   -will call to review the results   -repeat CT scan to assess lung nodules   -continue  Atorvastatin 40 mg daily, Metoprolol ER 25 mg daily and Fosinopril 10 mg daily     I appreciate the opportunity to participate in the patient's care, please call with any questions     MASON Lay-CNP       [1] No family history on file.

## 2025-06-05 ENCOUNTER — APPOINTMENT (OUTPATIENT)
Facility: CLINIC | Age: 80
End: 2025-06-05
Payer: MEDICARE

## 2025-06-05 VITALS
HEART RATE: 94 BPM | DIASTOLIC BLOOD PRESSURE: 80 MMHG | BODY MASS INDEX: 30.31 KG/M2 | WEIGHT: 200 LBS | OXYGEN SATURATION: 94 % | SYSTOLIC BLOOD PRESSURE: 140 MMHG | HEIGHT: 68 IN

## 2025-06-05 DIAGNOSIS — E11.65 TYPE 2 DIABETES MELLITUS WITH HYPERGLYCEMIA, WITHOUT LONG-TERM CURRENT USE OF INSULIN: ICD-10-CM

## 2025-06-05 DIAGNOSIS — I10 HYPERTENSION, UNSPECIFIED TYPE: ICD-10-CM

## 2025-06-05 DIAGNOSIS — R23.2 HOT FLASH IN MALE: ICD-10-CM

## 2025-06-05 DIAGNOSIS — R05.3 CHRONIC COUGH: ICD-10-CM

## 2025-06-05 DIAGNOSIS — C61 MALIGNANT NEOPLASM OF PROSTATE (MULTI): ICD-10-CM

## 2025-06-05 DIAGNOSIS — F32.A DEPRESSIVE DISORDER: ICD-10-CM

## 2025-06-05 DIAGNOSIS — Z00.00 ENCOUNTER FOR ANNUAL PHYSICAL EXAM: ICD-10-CM

## 2025-06-05 DIAGNOSIS — F43.21 COMPLICATED GRIEF: ICD-10-CM

## 2025-06-05 DIAGNOSIS — Z00.00 MEDICARE ANNUAL WELLNESS VISIT, SUBSEQUENT: Primary | ICD-10-CM

## 2025-06-05 RX ORDER — FLUTICASONE PROPIONATE 50 MCG
2 SPRAY, SUSPENSION (ML) NASAL DAILY
Qty: 16 G | Refills: 1 | Status: SHIPPED | OUTPATIENT
Start: 2025-06-05 | End: 2025-08-04

## 2025-06-05 ASSESSMENT — ACTIVITIES OF DAILY LIVING (ADL)
DOING_HOUSEWORK: INDEPENDENT
DRESSING: INDEPENDENT
MANAGING_FINANCES: INDEPENDENT
GROCERY_SHOPPING: INDEPENDENT
TAKING_MEDICATION: INDEPENDENT
BATHING: INDEPENDENT

## 2025-06-05 NOTE — PROGRESS NOTES
Subjective   Patient ID: Eleno Glaser is a 79 y.o. male who presents for Annual Exam.  Today he is accompanied by alone.     HPI  Patient had cataract surgery done recently which went well.   Patient had a recent workup TTE and cardiac monitor for shortness of breath by cardiology   He had LHC in 2021 which showed nonobstructive CAD treated with medication management.     Overall patient is doing well.   Immunization: Tdap UTD,   Influenza vaccine UTD  Shingrix need the second dose of shingle vaccine. , PCV UTD  Colon Cancer Screening: No family history  Diet: no veggie and fruit in the diet.   Exercise: no   Tobacco: Denies use  EtOH: Rarely Socially      Chronic cough  Patient is complaining about a cough which has been going on for the past year.   The cough is random and he can start during the day or during the night and sometimes wakes him up.   Sometimes he would have coughing spells with which last for about 5 minutes and then he might puke but that is not often.   He has mentioned that he would have a burning sensation in the nose and will start sneezing but does not know what the triggers are.   The symptoms has been on and off.   Denied Wheezing, cp, RN, congest  Not sure what the trigger are.   Does not have Pet at home.   Carpet at home but no hard wood.   No smoke hx .   No Asthma or COPD.   Patient had been complaining of some shortness of breath which has been worked up by cardiology . He has been showing lung nodule on the CT scan.  The cardiology decided to repeat the CT scan we will follow-up with the results.       Type 2 diabetes mellitus  Patient was seen recently by endocrino, Dr. Bray due to his A1c being 8.2 (done last month)  Patient currently is taking insuline glargine 20 units per day  Insulin lispro 3 units ISS and metformin 500 mg twice daily  Patient had eye exam  Patient is due for random urine albumin ratio     Dyslipidemia  Patient is taking atorvastatin 40 mg daily  Last lipid  panel , done a month ago, was within normal limit except for high triglyceride.   Does not report any side effect from medication  Request medication refill     Hypertension  His BP today is 140/80 mmHg  Patient does report having whitecoat syndrome.  Her blood pressure at home is around 140/80 mmHg.   Currently is taking fosinopril 10 mg and metoprolol 25 mg tablet  Does not report any side effect        Filiberto esophagitis   Diagnosed in .   he is taking Nexium 40 mg tablet  Patient reported good control of his symptoms with this medication  Last EGD was in 2021 recommendation were to follow up in 2-3 years   Patient has the order placed and is going to be tomorrow.      BPH  Currently is taking tamsulosine 0.4 mg  and myrbetriq   Last PSA was normal.  Last time that he saw urology doctor was a year ago.     Depression  Patient is missing his wife who  in .   He sometimes feels down depressed, and feeling guilty but this is not a persistent symptoms.  He does have some suicidal ideation but denied acting on.  He is not planning on act.   Patient has not seen any psychiatry and is not on any medication    Medications Ordered Prior to Encounter[1]     Allergies[2]    Immunization History   Administered Date(s) Administered    COVID-19, mRNA, LNP-S, PF, 30 mcg/0.3 mL dose 2021, 2021    Flu vaccine, quadrivalent, high-dose, preservative free, age 65y+ (FLUZONE) 10/07/2020, 2021, 2022, 2023    Flu vaccine, trivalent, preservative free, HIGH-DOSE, age 65y+ (Fluzone) 10/03/2014, 10/21/2016, 2017, 2019, 2025    Influenza, Unspecified 10/01/2012    Influenza, trivalent, adjuvanted 10/16/2018    Novel influenza-H1N1-09, preservative-free 2009    Pfizer COVID-19 vaccine, bivalent, age 12 years and older (30 mcg/0.3 mL) 2022    Pfizer Gray Cap SARS-CoV-2 2022    Pneumococcal conjugate vaccine, 13-valent (PREVNAR 13) 05/15/2015    Pneumococcal  "polysaccharide vaccine, 23-valent, age 2 years and older (PNEUMOVAX 23) 12/10/2009, 10/21/2016    RSV, 60 Years And Older (AREXVY) 04/18/2025    Tdap vaccine, age 7 year and older (BOOSTRIX, ADACEL) 12/10/2009, 04/18/2025    Zoster vaccine, recombinant, adult (SHINGRIX) 04/18/2025    Zoster, live 03/10/2013         Review of Systems  All pertinent positive symptoms are included in the history of present illness.  All other systems have been reviewed and are negative and noncontributory to this patient's current ailments.     Objective   /80   Pulse 94   Ht 1.727 m (5' 8\")   Wt 90.7 kg (200 lb)   SpO2 94%   BMI 30.41 kg/m²   BSA: 2.09 meters squared  Office Visit on 06/04/2025   Component Date Value Ref Range Status    Ventricular Rate 06/04/2025 104  BPM Preliminary    Atrial Rate 06/04/2025 104  BPM Preliminary    TX Interval 06/04/2025 126  ms Preliminary    QRS Duration 06/04/2025 84  ms Preliminary    QT Interval 06/04/2025 344  ms Preliminary    QTC Calculation(Bazett) 06/04/2025 452  ms Preliminary    P Axis 06/04/2025 44  degrees Preliminary    R Axis 06/04/2025 -84  degrees Preliminary    T Axis 06/04/2025 43  degrees Preliminary    QRS Count 06/04/2025 17  beats Preliminary    Q Onset 06/04/2025 212  ms Preliminary    P Onset 06/04/2025 149  ms Preliminary    P Offset 06/04/2025 199  ms Preliminary    T Offset 06/04/2025 384  ms Preliminary    QTC Fredericia 06/04/2025 413  ms Preliminary       Physical Exam  CONSTITUTIONAL - well nourished, well developed, looks like stated age, in no acute distress, not ill-appearing, and not tired appearing  SKIN - normal skin color and pigmentation, normal skin turgor without rash, lesions, or nodules visualized  HEAD - no trauma, normocephalic  ENT - TM's intact, no injection, no signs of infection, uvula midline, normal tongue movement and throat normal, no exudate, nasal passage without discharge and patent  NECK - supple without rigidity, no neck mass was " observed, no thyromegaly or thyroid nodules  CHEST - clear to auscultation, no wheezing, no crackles and no rales, good effort  CARDIAC - regular rate and regular rhythm, no skipped beats, no murmur  ABDOMEN - no organomegaly, soft, nontender, nondistended, normal bowel sounds, no guarding/rebound/rigidity  EXTREMITIES - no edema, no deformities  NEUROLOGICAL - normal gait, normal balance, normal motor, no ataxia  PSYCHIATRIC - alert, pleasant and cordial, age-appropriate    Assessment/Plan   He is 79 years old man with past medical history of type 2 diabetes mellitus, hypertension, dyslipidemia, Rojas's esophagus, BPH who came today to the office for Medicare wellness visit    Diagnoses and all orders for this visit:  Medicare annual wellness visit, subsequent  - Did do complete physical exam  -Patient is up-to-date with labs  -Emphasized the role of diet and exercise.   - He is up-to-date with immunization    Hypertension, unspecified type  -     Albumin-Creatinine Ratio, Urine Random; Future  -Blood pressure today is at goal continue with the same regiment fosinopril and metoprolol succinate 25 mg tablet daily.     Type 2 diabetes mellitus with hyperglycemia, without long-term current use of insulin  -Continue following with your endocrinology doctor Dr. Bray    Chronic cough  Lung nodule  -     fluticasone (Flonase) 50 mcg/actuation nasal spray; Administer 2 sprays into each nostril once daily. Shake gently. Before first use, prime pump. After use, clean tip and replace cap.  -Will also follow-up with a CT scan to see if the lung nodule has been increased and if need to do more further workup.     Complicated grief  -     Referral to Psychology; Future    Filiberto esophagus  Continue with omeprazole 40 mg tablet daily.   He has scheduled EGD    BPH  Patient tried to be out of this medication around the time of cataract surgery and he was experiencing symptoms.   -Continue with the same regiment tamsulosin.  "      Follow-up in 3 months for chronic management or sooner if needed    I discussed my plan with my attending, Dr. Schafer.    Rajwinder Travis. MD  Family medicine resident  PGY3           [1]   Current Outpatient Medications on File Prior to Visit   Medication Sig Dispense Refill    Admelog SoloStar U-100 Insulin 100 unit/mL pen INJECT 10 UNITS UNDER THE SKIN 3 TIMES DAILY (MORNING, MIDDAY AND LATE AFTERNOON) TAKE AS DIRECTED PER INSULIN INSTRUCTIONS 15 mL 5    alpha lipoic acid 600 mg capsule Take 1 capsule by mouth once daily.      aspirin 81 mg EC tablet Take 1 tablet (81 mg) by mouth once daily.      aspirin-acetaminophen-caffeine (Excedrin Migraine) 250-250-65 mg tablet Take 1 tablet by mouth once daily.      atorvastatin (Lipitor) 40 mg tablet Take 1 tablet (40 mg) by mouth once daily at bedtime. 90 tablet 1    blood sugar diagnostic (OneTouch Verio test strips) As directed 400 strip 1    esomeprazole (NexIUM) 40 mg DR capsule TAKE 1 CAPSULE BY MOUTH DAILY  BEFORE BREAKFAST 90 capsule 1    fosinopril (Monopril) 10 mg tablet Take 1 tablet (10 mg) by mouth once daily. 90 tablet 1    hydrOXYzine HCL (Atarax) 25 mg tablet Take 1 tablet (25 mg) by mouth 3 times a day as needed for anxiety or itching. 270 tablet 1    insulin glargine (Lantus Solostar U-100 Insulin) 100 unit/mL (3 mL) pen Inject 20 Units under the skin once daily at bedtime. Take as directed per insulin instructions. 30 mL 2    metFORMIN XR (Glucophage-XR) 500 mg 24 hr tablet Take 1 tablet (500 mg) by mouth 2 times daily (morning and late afternoon). Do not crush, chew, or split. 180 tablet 1    metoprolol succinate XL (Toprol-XL) 25 mg 24 hr tablet Take 1 tablet (25 mg) by mouth once daily. 90 tablet 1    pen needle, diabetic 31 gauge x 5/16\" needle To be used 4 times daily with insulin shots 200 each 0    tamsulosin (Flomax) 0.4 mg 24 hr capsule TAKE 1 CAPSULE BY MOUTH IN THE  MORNING AND 1 CAPSULE BY MOUTH  AT BEDTIME 180 capsule 1     No current " facility-administered medications on file prior to visit.   [2] No Known Allergies

## 2025-06-05 NOTE — H&P (VIEW-ONLY)
Subjective   Patient ID: Eleno Glaser is a 79 y.o. male who presents for Annual Exam.  Today he is accompanied by alone.     HPI  Patient had cataract surgery done recently which went well.   Patient had a recent workup TTE and cardiac monitor for shortness of breath by cardiology   He had LHC in 2021 which showed nonobstructive CAD treated with medication management.     Overall patient is doing well.   Immunization: Tdap UTD,   Influenza vaccine UTD  Shingrix need the second dose of shingle vaccine. , PCV UTD  Colon Cancer Screening: No family history  Diet: no veggie and fruit in the diet.   Exercise: no   Tobacco: Denies use  EtOH: Rarely Socially      Chronic cough  Patient is complaining about a cough which has been going on for the past year.   The cough is random and he can start during the day or during the night and sometimes wakes him up.   Sometimes he would have coughing spells with which last for about 5 minutes and then he might puke but that is not often.   He has mentioned that he would have a burning sensation in the nose and will start sneezing but does not know what the triggers are.   The symptoms has been on and off.   Denied Wheezing, cp, RN, congest  Not sure what the trigger are.   Does not have Pet at home.   Carpet at home but no hard wood.   No smoke hx .   No Asthma or COPD.   Patient had been complaining of some shortness of breath which has been worked up by cardiology . He has been showing lung nodule on the CT scan.  The cardiology decided to repeat the CT scan we will follow-up with the results.       Type 2 diabetes mellitus  Patient was seen recently by endocrino, Dr. Bray due to his A1c being 8.2 (done last month)  Patient currently is taking insuline glargine 20 units per day  Insulin lispro 3 units ISS and metformin 500 mg twice daily  Patient had eye exam  Patient is due for random urine albumin ratio     Dyslipidemia  Patient is taking atorvastatin 40 mg daily  Last lipid  panel , done a month ago, was within normal limit except for high triglyceride.   Does not report any side effect from medication  Request medication refill     Hypertension  His BP today is 140/80 mmHg  Patient does report having whitecoat syndrome.  Her blood pressure at home is around 140/80 mmHg.   Currently is taking fosinopril 10 mg and metoprolol 25 mg tablet  Does not report any side effect        Filiberto esophagitis   Diagnosed in .   he is taking Nexium 40 mg tablet  Patient reported good control of his symptoms with this medication  Last EGD was in 2021 recommendation were to follow up in 2-3 years   Patient has the order placed and is going to be tomorrow.      BPH  Currently is taking tamsulosine 0.4 mg  and myrbetriq   Last PSA was normal.  Last time that he saw urology doctor was a year ago.     Depression  Patient is missing his wife who  in .   He sometimes feels down depressed, and feeling guilty but this is not a persistent symptoms.  He does have some suicidal ideation but denied acting on.  He is not planning on act.   Patient has not seen any psychiatry and is not on any medication    Medications Ordered Prior to Encounter[1]     Allergies[2]    Immunization History   Administered Date(s) Administered    COVID-19, mRNA, LNP-S, PF, 30 mcg/0.3 mL dose 2021, 2021    Flu vaccine, quadrivalent, high-dose, preservative free, age 65y+ (FLUZONE) 10/07/2020, 2021, 2022, 2023    Flu vaccine, trivalent, preservative free, HIGH-DOSE, age 65y+ (Fluzone) 10/03/2014, 10/21/2016, 2017, 2019, 2025    Influenza, Unspecified 10/01/2012    Influenza, trivalent, adjuvanted 10/16/2018    Novel influenza-H1N1-09, preservative-free 2009    Pfizer COVID-19 vaccine, bivalent, age 12 years and older (30 mcg/0.3 mL) 2022    Pfizer Gray Cap SARS-CoV-2 2022    Pneumococcal conjugate vaccine, 13-valent (PREVNAR 13) 05/15/2015    Pneumococcal  "polysaccharide vaccine, 23-valent, age 2 years and older (PNEUMOVAX 23) 12/10/2009, 10/21/2016    RSV, 60 Years And Older (AREXVY) 04/18/2025    Tdap vaccine, age 7 year and older (BOOSTRIX, ADACEL) 12/10/2009, 04/18/2025    Zoster vaccine, recombinant, adult (SHINGRIX) 04/18/2025    Zoster, live 03/10/2013         Review of Systems  All pertinent positive symptoms are included in the history of present illness.  All other systems have been reviewed and are negative and noncontributory to this patient's current ailments.     Objective   /80   Pulse 94   Ht 1.727 m (5' 8\")   Wt 90.7 kg (200 lb)   SpO2 94%   BMI 30.41 kg/m²   BSA: 2.09 meters squared  Office Visit on 06/04/2025   Component Date Value Ref Range Status    Ventricular Rate 06/04/2025 104  BPM Preliminary    Atrial Rate 06/04/2025 104  BPM Preliminary    AL Interval 06/04/2025 126  ms Preliminary    QRS Duration 06/04/2025 84  ms Preliminary    QT Interval 06/04/2025 344  ms Preliminary    QTC Calculation(Bazett) 06/04/2025 452  ms Preliminary    P Axis 06/04/2025 44  degrees Preliminary    R Axis 06/04/2025 -84  degrees Preliminary    T Axis 06/04/2025 43  degrees Preliminary    QRS Count 06/04/2025 17  beats Preliminary    Q Onset 06/04/2025 212  ms Preliminary    P Onset 06/04/2025 149  ms Preliminary    P Offset 06/04/2025 199  ms Preliminary    T Offset 06/04/2025 384  ms Preliminary    QTC Fredericia 06/04/2025 413  ms Preliminary       Physical Exam  CONSTITUTIONAL - well nourished, well developed, looks like stated age, in no acute distress, not ill-appearing, and not tired appearing  SKIN - normal skin color and pigmentation, normal skin turgor without rash, lesions, or nodules visualized  HEAD - no trauma, normocephalic  ENT - TM's intact, no injection, no signs of infection, uvula midline, normal tongue movement and throat normal, no exudate, nasal passage without discharge and patent  NECK - supple without rigidity, no neck mass was " observed, no thyromegaly or thyroid nodules  CHEST - clear to auscultation, no wheezing, no crackles and no rales, good effort  CARDIAC - regular rate and regular rhythm, no skipped beats, no murmur  ABDOMEN - no organomegaly, soft, nontender, nondistended, normal bowel sounds, no guarding/rebound/rigidity  EXTREMITIES - no edema, no deformities  NEUROLOGICAL - normal gait, normal balance, normal motor, no ataxia  PSYCHIATRIC - alert, pleasant and cordial, age-appropriate    Assessment/Plan   He is 79 years old man with past medical history of type 2 diabetes mellitus, hypertension, dyslipidemia, Rojas's esophagus, BPH who came today to the office for Medicare wellness visit    Diagnoses and all orders for this visit:  Medicare annual wellness visit, subsequent  - Did do complete physical exam  -Patient is up-to-date with labs  -Emphasized the role of diet and exercise.   - He is up-to-date with immunization    Hypertension, unspecified type  -     Albumin-Creatinine Ratio, Urine Random; Future  -Blood pressure today is at goal continue with the same regiment fosinopril and metoprolol succinate 25 mg tablet daily.     Type 2 diabetes mellitus with hyperglycemia, without long-term current use of insulin  -Continue following with your endocrinology doctor Dr. rBay    Chronic cough  Lung nodule  -     fluticasone (Flonase) 50 mcg/actuation nasal spray; Administer 2 sprays into each nostril once daily. Shake gently. Before first use, prime pump. After use, clean tip and replace cap.  -Will also follow-up with a CT scan to see if the lung nodule has been increased and if need to do more further workup.     Complicated grief  -     Referral to Psychology; Future    Filiberto esophagus  Continue with omeprazole 40 mg tablet daily.   He has scheduled EGD    BPH  Patient tried to be out of this medication around the time of cataract surgery and he was experiencing symptoms.   -Continue with the same regiment tamsulosin.  "      Follow-up in 3 months for chronic management or sooner if needed    I discussed my plan with my attending, Dr. Schafer.    Rajwinder Travis. MD  Family medicine resident  PGY3           [1]   Current Outpatient Medications on File Prior to Visit   Medication Sig Dispense Refill    Admelog SoloStar U-100 Insulin 100 unit/mL pen INJECT 10 UNITS UNDER THE SKIN 3 TIMES DAILY (MORNING, MIDDAY AND LATE AFTERNOON) TAKE AS DIRECTED PER INSULIN INSTRUCTIONS 15 mL 5    alpha lipoic acid 600 mg capsule Take 1 capsule by mouth once daily.      aspirin 81 mg EC tablet Take 1 tablet (81 mg) by mouth once daily.      aspirin-acetaminophen-caffeine (Excedrin Migraine) 250-250-65 mg tablet Take 1 tablet by mouth once daily.      atorvastatin (Lipitor) 40 mg tablet Take 1 tablet (40 mg) by mouth once daily at bedtime. 90 tablet 1    blood sugar diagnostic (OneTouch Verio test strips) As directed 400 strip 1    esomeprazole (NexIUM) 40 mg DR capsule TAKE 1 CAPSULE BY MOUTH DAILY  BEFORE BREAKFAST 90 capsule 1    fosinopril (Monopril) 10 mg tablet Take 1 tablet (10 mg) by mouth once daily. 90 tablet 1    hydrOXYzine HCL (Atarax) 25 mg tablet Take 1 tablet (25 mg) by mouth 3 times a day as needed for anxiety or itching. 270 tablet 1    insulin glargine (Lantus Solostar U-100 Insulin) 100 unit/mL (3 mL) pen Inject 20 Units under the skin once daily at bedtime. Take as directed per insulin instructions. 30 mL 2    metFORMIN XR (Glucophage-XR) 500 mg 24 hr tablet Take 1 tablet (500 mg) by mouth 2 times daily (morning and late afternoon). Do not crush, chew, or split. 180 tablet 1    metoprolol succinate XL (Toprol-XL) 25 mg 24 hr tablet Take 1 tablet (25 mg) by mouth once daily. 90 tablet 1    pen needle, diabetic 31 gauge x 5/16\" needle To be used 4 times daily with insulin shots 200 each 0    tamsulosin (Flomax) 0.4 mg 24 hr capsule TAKE 1 CAPSULE BY MOUTH IN THE  MORNING AND 1 CAPSULE BY MOUTH  AT BEDTIME 180 capsule 1     No current " facility-administered medications on file prior to visit.   [2] No Known Allergies

## 2025-06-06 ENCOUNTER — HOSPITAL ENCOUNTER (OUTPATIENT)
Dept: OPERATING ROOM | Facility: HOSPITAL | Age: 80
Setting detail: OUTPATIENT SURGERY
Discharge: HOME | End: 2025-06-06
Payer: MEDICARE

## 2025-06-06 ENCOUNTER — ANESTHESIA (OUTPATIENT)
Dept: OPERATING ROOM | Facility: HOSPITAL | Age: 80
End: 2025-06-06
Payer: MEDICARE

## 2025-06-06 ENCOUNTER — ANESTHESIA EVENT (OUTPATIENT)
Dept: OPERATING ROOM | Facility: HOSPITAL | Age: 80
End: 2025-06-06
Payer: MEDICARE

## 2025-06-06 VITALS
SYSTOLIC BLOOD PRESSURE: 132 MMHG | HEART RATE: 80 BPM | BODY MASS INDEX: 29.26 KG/M2 | HEIGHT: 69 IN | OXYGEN SATURATION: 96 % | TEMPERATURE: 97.3 F | RESPIRATION RATE: 17 BRPM | WEIGHT: 197.53 LBS | DIASTOLIC BLOOD PRESSURE: 70 MMHG

## 2025-06-06 DIAGNOSIS — K22.70 BARRETT'S ESOPHAGUS WITHOUT DYSPLASIA: ICD-10-CM

## 2025-06-06 LAB — GLUCOSE BLD MANUAL STRIP-MCNC: 202 MG/DL (ref 74–99)

## 2025-06-06 PROCEDURE — 2500000004 HC RX 250 GENERAL PHARMACY W/ HCPCS (ALT 636 FOR OP/ED): Performed by: NURSE ANESTHETIST, CERTIFIED REGISTERED

## 2025-06-06 PROCEDURE — 43239 EGD BIOPSY SINGLE/MULTIPLE: CPT | Performed by: SURGERY

## 2025-06-06 PROCEDURE — 3700000001 HC GENERAL ANESTHESIA TIME - INITIAL BASE CHARGE: Performed by: ANESTHESIOLOGY

## 2025-06-06 PROCEDURE — 3600000002 HC OR TIME - INITIAL BASE CHARGE - PROCEDURE LEVEL TWO: Performed by: ANESTHESIOLOGY

## 2025-06-06 PROCEDURE — 3700000002 HC GENERAL ANESTHESIA TIME - EACH INCREMENTAL 1 MINUTE: Performed by: ANESTHESIOLOGY

## 2025-06-06 PROCEDURE — 3600000007 HC OR TIME - EACH INCREMENTAL 1 MINUTE - PROCEDURE LEVEL TWO: Performed by: ANESTHESIOLOGY

## 2025-06-06 PROCEDURE — 7100000010 HC PHASE TWO TIME - EACH INCREMENTAL 1 MINUTE: Performed by: ANESTHESIOLOGY

## 2025-06-06 PROCEDURE — 7100000009 HC PHASE TWO TIME - INITIAL BASE CHARGE: Performed by: ANESTHESIOLOGY

## 2025-06-06 PROCEDURE — 82947 ASSAY GLUCOSE BLOOD QUANT: CPT

## 2025-06-06 RX ORDER — PANTOPRAZOLE SODIUM 40 MG/1
40 TABLET, DELAYED RELEASE ORAL
Qty: 30 TABLET | Refills: 11 | Status: SHIPPED | OUTPATIENT
Start: 2025-06-06 | End: 2025-06-06 | Stop reason: HOSPADM

## 2025-06-06 RX ORDER — FENTANYL CITRATE 50 UG/ML
INJECTION, SOLUTION INTRAMUSCULAR; INTRAVENOUS AS NEEDED
Status: DISCONTINUED | OUTPATIENT
Start: 2025-06-06 | End: 2025-06-06

## 2025-06-06 RX ORDER — PROPOFOL 10 MG/ML
INJECTION, EMULSION INTRAVENOUS AS NEEDED
Status: DISCONTINUED | OUTPATIENT
Start: 2025-06-06 | End: 2025-06-06

## 2025-06-06 RX ORDER — GLYCOPYRROLATE 0.2 MG/ML
INJECTION INTRAMUSCULAR; INTRAVENOUS AS NEEDED
Status: DISCONTINUED | OUTPATIENT
Start: 2025-06-06 | End: 2025-06-06

## 2025-06-06 RX ORDER — LIDOCAINE HCL/PF 100 MG/5ML
SYRINGE (ML) INTRAVENOUS AS NEEDED
Status: DISCONTINUED | OUTPATIENT
Start: 2025-06-06 | End: 2025-06-06

## 2025-06-06 RX ADMIN — GLYCOPYRROLATE 0.1 MG: 0.2 INJECTION INTRAMUSCULAR; INTRAVENOUS at 09:00

## 2025-06-06 RX ADMIN — LIDOCAINE HYDROCHLORIDE 100 MG: 20 INJECTION INTRAVENOUS at 09:03

## 2025-06-06 RX ADMIN — PROPOFOL 30 MG: 10 INJECTION, EMULSION INTRAVENOUS at 09:04

## 2025-06-06 RX ADMIN — SODIUM CHLORIDE, POTASSIUM CHLORIDE, SODIUM LACTATE AND CALCIUM CHLORIDE: 600; 310; 30; 20 INJECTION, SOLUTION INTRAVENOUS at 09:00

## 2025-06-06 RX ADMIN — SODIUM CHLORIDE, POTASSIUM CHLORIDE, SODIUM LACTATE AND CALCIUM CHLORIDE: 600; 310; 30; 20 INJECTION, SOLUTION INTRAVENOUS at 09:13

## 2025-06-06 RX ADMIN — FENTANYL CITRATE 50 MCG: 50 INJECTION, SOLUTION INTRAMUSCULAR; INTRAVENOUS at 09:00

## 2025-06-06 RX ADMIN — FENTANYL CITRATE 50 MCG: 50 INJECTION, SOLUTION INTRAMUSCULAR; INTRAVENOUS at 09:03

## 2025-06-06 RX ADMIN — PROPOFOL 30 MG: 10 INJECTION, EMULSION INTRAVENOUS at 09:03

## 2025-06-06 RX ADMIN — PROPOFOL 30 MG: 10 INJECTION, EMULSION INTRAVENOUS at 09:05

## 2025-06-06 RX ADMIN — PROPOFOL 140 MCG/KG/MIN: 10 INJECTION, EMULSION INTRAVENOUS at 09:06

## 2025-06-06 SDOH — HEALTH STABILITY: MENTAL HEALTH: CURRENT SMOKER: 0

## 2025-06-06 ASSESSMENT — COLUMBIA-SUICIDE SEVERITY RATING SCALE - C-SSRS
2. HAVE YOU ACTUALLY HAD ANY THOUGHTS OF KILLING YOURSELF?: NO
1. IN THE PAST MONTH, HAVE YOU WISHED YOU WERE DEAD OR WISHED YOU COULD GO TO SLEEP AND NOT WAKE UP?: YES
1. IN THE PAST MONTH, HAVE YOU WISHED YOU WERE DEAD OR WISHED YOU COULD GO TO SLEEP AND NOT WAKE UP?: YES
6. HAVE YOU EVER DONE ANYTHING, STARTED TO DO ANYTHING, OR PREPARED TO DO ANYTHING TO END YOUR LIFE?: NO

## 2025-06-06 NOTE — DISCHARGE INSTRUCTIONS

## 2025-06-06 NOTE — SIGNIFICANT EVENT
"Patient states he has some depression, his family  Knows . He has had a hard time since his wife  six years ago. I offered for social service to talk with him, he denied, I spoke to his son and asked If he thought he could be suicidal and he stated no,  patient stated \"Im too chicken\" I recommended that he may want to see a grief counselor.  "

## 2025-06-06 NOTE — ANESTHESIA POSTPROCEDURE EVALUATION
Patient: Eleno Glaser    Procedure Summary       Date: 06/06/25 Room / Location: [facility] OR    Anesthesia Start: 0900 Anesthesia Stop: 0920    Procedure: EGD Diagnosis: Rojas's esophagus without dysplasia    Scheduled Providers: Kianna Dawn MD; Adiel Ambrosio MD Responsible Provider: Adiel Ambrosio MD    Anesthesia Type: MAC ASA Status: 3            Anesthesia Type: MAC    Vitals Value Taken Time   BP 85/59 06/06/25 09:16   Temp 36.3 °C (97.3 °F) 06/06/25 09:16   Pulse 76 06/06/25 09:25   Resp 16 06/06/25 09:25   SpO2 98 % 06/06/25 09:25       Anesthesia Post Evaluation    Patient location during evaluation: bedside  Patient participation: complete - patient participated  Level of consciousness: awake and alert  Pain management: adequate  Airway patency: patent  Cardiovascular status: acceptable  Respiratory status: acceptable  Hydration status: acceptable  Postoperative Nausea and Vomiting: none        No notable events documented.

## 2025-06-06 NOTE — ANESTHESIA PREPROCEDURE EVALUATION
Patient: Eleno Glaser    Procedure Information       Date/Time: 06/06/25 0900    Scheduled providers: Kianna Dawn MD; Adiel Ambrosio MD    Procedure: EGD    Location: [facility] OR            Relevant Problems   Anesthesia (within normal limits)      Cardiac   (+) Hyperlipidemia   (+) Hypertension      Pulmonary (within normal limits)      Neuro   (+) Depressive disorder      GI   (+) Gastroesophageal reflux disease without esophagitis      /Renal   (+) Malignant neoplasm of prostate (Multi)      Liver (within normal limits)      Endocrine   (+) Type 2 diabetes mellitus with other specified complication, without long-term current use of insulin      Hematology (within normal limits)      Musculoskeletal   (+) Degenerative cervical spinal stenosis      HEENT (within normal limits)      ID (within normal limits)      Skin   (+) Rash and nonspecific skin eruption       Clinical information reviewed:   Tobacco  Allergies  Meds   Med Hx  Surg Hx   Fam Hx  Soc Hx        NPO Detail:  No data recorded     Physical Exam    Airway  Mallampati: I  TM distance: >3 FB  Neck ROM: full  Mouth opening: 3 or more finger widths     Cardiovascular - normal exam   Dental   Comments: Multiple missing teeth , upper partial     Pulmonary - normal exam   Abdominal - normal exam           Anesthesia Plan    History of general anesthesia?: yes  History of complications of general anesthesia?: no    ASA 3     MAC     The patient is not a current smoker.    intravenous induction   Anesthetic plan and risks discussed with patient.

## 2025-06-11 ENCOUNTER — HOSPITAL ENCOUNTER (OUTPATIENT)
Dept: CARDIOLOGY | Facility: CLINIC | Age: 80
Discharge: HOME | End: 2025-06-11
Payer: MEDICARE

## 2025-06-11 DIAGNOSIS — R00.2 HEART PALPITATIONS: ICD-10-CM

## 2025-06-11 DIAGNOSIS — R06.02 SHORTNESS OF BREATH: Primary | ICD-10-CM

## 2025-06-11 DIAGNOSIS — R06.02 SOB (SHORTNESS OF BREATH): ICD-10-CM

## 2025-06-11 LAB
AORTIC VALVE PEAK VELOCITY: 1.32 M/S
AV PEAK GRADIENT: 7 MMHG
AVA (PEAK VEL): 3.19 CM2
EJECTION FRACTION APICAL 4 CHAMBER: 59.7
EJECTION FRACTION: 63 %
LEFT ATRIUM VOLUME AREA LENGTH INDEX BSA: 30.3 ML/M2
LEFT VENTRICLE INTERNAL DIMENSION DIASTOLE: 3.58 CM (ref 3.5–6)
LEFT VENTRICULAR OUTFLOW TRACT DIAMETER: 2.13 CM
MITRAL VALVE E/A RATIO: 0.66
MITRAL VALVE E/E' RATIO: 6.76
RIGHT VENTRICLE FREE WALL PEAK S': 15.03 CM/S
RIGHT VENTRICLE PEAK SYSTOLIC PRESSURE: 31 MMHG
TRICUSPID ANNULAR PLANE SYSTOLIC EXCURSION: 2 CM

## 2025-06-11 PROCEDURE — 93246 EXT ECG>7D<15D RECORDING: CPT

## 2025-06-11 PROCEDURE — 93306 TTE W/DOPPLER COMPLETE: CPT | Performed by: INTERNAL MEDICINE

## 2025-06-11 PROCEDURE — 93306 TTE W/DOPPLER COMPLETE: CPT

## 2025-06-17 ENCOUNTER — TELEPHONE (OUTPATIENT)
Dept: CARDIOLOGY | Facility: HOSPITAL | Age: 80
End: 2025-06-17
Payer: MEDICARE

## 2025-06-19 LAB
LABORATORY COMMENT REPORT: NORMAL
PATH REPORT.FINAL DX SPEC: NORMAL
PATH REPORT.GROSS SPEC: NORMAL
PATH REPORT.RELEVANT HX SPEC: NORMAL
PATH REPORT.TOTAL CANCER: NORMAL

## 2025-06-26 ENCOUNTER — HOSPITAL ENCOUNTER (OUTPATIENT)
Dept: RADIOLOGY | Facility: HOSPITAL | Age: 80
End: 2025-06-26
Payer: MEDICARE

## 2025-07-21 DIAGNOSIS — C61 PROSTATE CANCER (MULTI): ICD-10-CM

## 2025-07-21 RX ORDER — TAMSULOSIN HYDROCHLORIDE 0.4 MG/1
0.4 CAPSULE ORAL 2 TIMES DAILY
Qty: 180 CAPSULE | Refills: 0 | Status: SHIPPED | OUTPATIENT
Start: 2025-07-21

## 2025-07-22 LAB
ALBUMIN/CREAT UR: 55 MG/G CREAT
CREAT UR-MCNC: 98 MG/DL (ref 20–320)
MICROALBUMIN UR-MCNC: 5.4 MG/DL

## 2025-08-04 ASSESSMENT — ENCOUNTER SYMPTOMS
FEVER: 0
SHORTNESS OF BREATH: 1
SWOLLEN GLANDS: 0
LEG PAIN: 0
HEADACHES: 1
CLAUDICATION: 0
SORE THROAT: 0
RHINORRHEA: 0
ORTHOPNEA: 0
WHEEZING: 0
NECK PAIN: 0
SPUTUM PRODUCTION: 0
PND: 0
SYNCOPE: 0
HEMOPTYSIS: 0
ABDOMINAL PAIN: 0
VOMITING: 0

## 2025-08-05 ENCOUNTER — APPOINTMENT (OUTPATIENT)
Facility: CLINIC | Age: 80
End: 2025-08-05
Payer: MEDICARE

## 2025-08-05 VITALS
BODY MASS INDEX: 29.03 KG/M2 | SYSTOLIC BLOOD PRESSURE: 136 MMHG | OXYGEN SATURATION: 97 % | DIASTOLIC BLOOD PRESSURE: 74 MMHG | HEART RATE: 88 BPM | WEIGHT: 196 LBS | HEIGHT: 69 IN

## 2025-08-05 DIAGNOSIS — I10 HYPERTENSION, UNSPECIFIED TYPE: ICD-10-CM

## 2025-08-05 DIAGNOSIS — R91.8 MULTIPLE LUNG NODULES: ICD-10-CM

## 2025-08-05 DIAGNOSIS — G95.20 CERVICAL SPINAL CORD COMPRESSION: ICD-10-CM

## 2025-08-05 DIAGNOSIS — R05.3 CHRONIC COUGH: ICD-10-CM

## 2025-08-05 DIAGNOSIS — E78.5 HYPERLIPIDEMIA, UNSPECIFIED HYPERLIPIDEMIA TYPE: ICD-10-CM

## 2025-08-05 DIAGNOSIS — E11.69 TYPE 2 DIABETES MELLITUS WITH OTHER SPECIFIED COMPLICATION, WITHOUT LONG-TERM CURRENT USE OF INSULIN: Primary | ICD-10-CM

## 2025-08-05 DIAGNOSIS — R06.02 SOB (SHORTNESS OF BREATH): ICD-10-CM

## 2025-08-05 DIAGNOSIS — K22.719 BARRETT'S ESOPHAGUS WITH DYSPLASIA: ICD-10-CM

## 2025-08-05 DIAGNOSIS — K21.9 GASTROESOPHAGEAL REFLUX DISEASE, UNSPECIFIED WHETHER ESOPHAGITIS PRESENT: ICD-10-CM

## 2025-08-05 DIAGNOSIS — M25.512 ACUTE PAIN OF LEFT SHOULDER: ICD-10-CM

## 2025-08-05 DIAGNOSIS — C61 MALIGNANT NEOPLASM OF PROSTATE (MULTI): ICD-10-CM

## 2025-08-05 LAB — POC HEMOGLOBIN A1C: 8.8 % (ref 4.2–6.5)

## 2025-08-05 PROCEDURE — 1159F MED LIST DOCD IN RCRD: CPT

## 2025-08-05 PROCEDURE — 99214 OFFICE O/P EST MOD 30 MIN: CPT

## 2025-08-05 PROCEDURE — 3075F SYST BP GE 130 - 139MM HG: CPT

## 2025-08-05 PROCEDURE — 3078F DIAST BP <80 MM HG: CPT

## 2025-08-05 PROCEDURE — G2211 COMPLEX E/M VISIT ADD ON: HCPCS

## 2025-08-05 PROCEDURE — 83036 HEMOGLOBIN GLYCOSYLATED A1C: CPT

## 2025-08-11 ENCOUNTER — HOSPITAL ENCOUNTER (OUTPATIENT)
Dept: RADIOLOGY | Facility: CLINIC | Age: 80
Discharge: HOME | End: 2025-08-11
Payer: MEDICARE

## 2025-08-11 DIAGNOSIS — M25.512 ACUTE PAIN OF LEFT SHOULDER: ICD-10-CM

## 2025-08-11 PROCEDURE — 73030 X-RAY EXAM OF SHOULDER: CPT | Mod: LEFT SIDE | Performed by: RADIOLOGY

## 2025-08-11 PROCEDURE — 73030 X-RAY EXAM OF SHOULDER: CPT | Mod: LT

## 2025-08-28 DIAGNOSIS — I10 HYPERTENSION, UNSPECIFIED TYPE: ICD-10-CM

## 2025-09-03 RX ORDER — METOPROLOL SUCCINATE 25 MG/1
25 TABLET, EXTENDED RELEASE ORAL DAILY
Qty: 90 TABLET | Refills: 1 | Status: SHIPPED | OUTPATIENT
Start: 2025-09-03

## 2025-09-05 ENCOUNTER — APPOINTMENT (OUTPATIENT)
Dept: RADIOLOGY | Facility: HOSPITAL | Age: 80
End: 2025-09-05
Payer: MEDICARE

## 2025-10-06 ENCOUNTER — APPOINTMENT (OUTPATIENT)
Dept: ENDOCRINOLOGY | Facility: CLINIC | Age: 80
End: 2025-10-06
Payer: MEDICARE

## 2025-10-13 ENCOUNTER — APPOINTMENT (OUTPATIENT)
Dept: PRIMARY CARE | Facility: CLINIC | Age: 80
End: 2025-10-13
Payer: MEDICARE